# Patient Record
Sex: FEMALE | Race: BLACK OR AFRICAN AMERICAN | Employment: UNEMPLOYED | URBAN - METROPOLITAN AREA
[De-identification: names, ages, dates, MRNs, and addresses within clinical notes are randomized per-mention and may not be internally consistent; named-entity substitution may affect disease eponyms.]

---

## 2017-03-30 ENCOUNTER — OFFICE VISIT (OUTPATIENT)
Dept: ENDOCRINOLOGY | Age: 57
End: 2017-03-30

## 2017-03-30 VITALS
HEIGHT: 70 IN | BODY MASS INDEX: 35.2 KG/M2 | WEIGHT: 245.9 LBS | TEMPERATURE: 98.3 F | HEART RATE: 86 BPM | SYSTOLIC BLOOD PRESSURE: 130 MMHG | DIASTOLIC BLOOD PRESSURE: 70 MMHG | RESPIRATION RATE: 18 BRPM

## 2017-03-30 DIAGNOSIS — E11.65 TYPE 2 DIABETES MELLITUS WITH HYPERGLYCEMIA, WITH LONG-TERM CURRENT USE OF INSULIN (HCC): Primary | ICD-10-CM

## 2017-03-30 DIAGNOSIS — Z79.4 TYPE 2 DIABETES MELLITUS WITH HYPERGLYCEMIA, WITH LONG-TERM CURRENT USE OF INSULIN (HCC): Primary | ICD-10-CM

## 2017-03-30 DIAGNOSIS — E78.2 MIXED HYPERLIPIDEMIA: ICD-10-CM

## 2017-03-30 DIAGNOSIS — I10 ESSENTIAL HYPERTENSION: ICD-10-CM

## 2017-03-30 NOTE — MR AVS SNAPSHOT
Visit Information Date & Time Provider Department Dept. Phone Encounter #  
 3/30/2017  8:45 AM Mandy Perez MD [facility] [phone] [id number] Follow-up Instructions Return in about 4 months (around 7/30/2017). Upcoming Health Maintenance Date Due Hepatitis C Screening 1960 FOOT EXAM Q1 6/2/1968 EYE EXAM RETINAL OR DILATED Q1 6/2/1968 Pneumococcal 19-64 Medium Risk (1 of 1 - PPSV23) 6/2/1977 DTaP/Tdap/Td series (1 - Tdap) 6/2/1979 PAP AKA CERVICAL CYTOLOGY 6/2/1979 BREAST CANCER SCRN MAMMOGRAM 6/2/2008 FOBT Q 1 YEAR AGE 50-75 6/2/2008 INFLUENZA AGE 9 TO ADULT 8/1/2016 HEMOGLOBIN A1C Q6M 9/23/2017 MICROALBUMIN Q1 3/23/2018 LIPID PANEL Q1 3/23/2018 Allergies as of 3/30/2017  Review Complete On: 3/30/2017 By: Ascencion Vega LPN No Known Allergies Current Immunizations  Never Reviewed No immunizations on file. Not reviewed this visit You Were Diagnosed With   
  
 Codes Comments Type 2 diabetes mellitus with hyperglycemia, with long-term current use of insulin (HCC)    -  Primary ICD-10-CM: E11.65, Z79.4 ICD-9-CM: 250.00, 790.29, V58.67 Mixed hyperlipidemia     ICD-10-CM: E78.2 ICD-9-CM: 272.2 Essential hypertension     ICD-10-CM: I10 
ICD-9-CM: 401.9 Vitals BP Pulse Temp Resp Height(growth percentile) Weight(growth percentile) 130/70 (BP 1 Location: Right arm, BP Patient Position: Sitting) 86 98.3 °F (36.8 °C) (Oral) 18 5' 10\" (1.778 m) 245 lb 14.4 oz (111.5 kg) BMI OB Status Smoking Status 35.28 kg/m2 Hysterectomy Never Smoker BMI and BSA Data Body Mass Index Body Surface Area  
 35.28 kg/m 2 2.35 m 2 Preferred Pharmacy Pharmacy Name Phone [address], [facility] [phone] Your Updated Medication List  
  
   
 This list is accurate as of: 3/30/17  9:07 AM.  Always use your most recent med list.  
  
  
  
  
 CeleBREX 200 mg capsule Generic drug:  celecoxib Take  by mouth two (2) times a day. cholecalciferol 1,000 unit tablet Commonly known as:  VITAMIN D3 Take  by mouth daily. empagliflozin 25 mg tablet Commonly known as:  Mickiel Del Take 1 Tab by mouth every morning. ESTRADIOL (BULK) 1 mg by Does Not Apply route. glucose blood VI test strips strip Commonly known as:  PRECISION XTRA TEST Tests 3x daily Dx Code: E11.65  
  
 insulin CONCENTRATED regular 500 unit/mL Soln Commonly known as:  HumuLIN R Inject 12-14 unit marking before breakfast, 10 unit marking at lunch, 10 unit marking at dinner Insulin Needles (Disposable) 31 gauge x 5/16\" Ndle Use to inject Victoza daily Insulin Syringe-Needle U-100 0.5 mL 31 gauge x 5/16 Syrg Commonly known as:  BD INSULIN SYRINGE ULT-FINE II  
Use to inject U-500 insulin 3x daily Lancets Misc Test blood glucose 3 times daily Dx Code: E11.65  
  
 lisinopril 10 mg tablet Commonly known as:  Marilynne Shows Take 20 mg by mouth daily. metFORMIN  mg tablet Commonly known as:  GLUCOPHAGE XR  
2 pills Twice a day with meals  
  
 omeprazole 20 mg capsule Commonly known as:  PRILOSEC Take 20 mg by mouth daily. TANZEUM SC  
by SubCUTAneous route every seven (7) days. tolterodine ER 2 mg ER capsule Commonly known as:  DETROL-LA Take 2 mg by mouth daily. ZOCOR 40 mg tablet Generic drug:  simvastatin Take  by mouth nightly. Follow-up Instructions Return in about 4 months (around 7/30/2017). Introducing [facility]! Dear Florencia Weathers: Thank you for requesting a wufoo account. Our records indicate that you already have an active wufoo account. You can access your account anytime at [email or web address] Did you know that you can access your hospital and ER discharge instructions at any time in GoodChime!? You can also review all of your test results from your hospital stay or ER visit. Additional Information If you have questions, please visit the Frequently Asked Questions section of the GoodChime! website at [email or web address] Remember, GoodChime! is NOT to be used for urgent needs. For medical emergencies, dial 911. Now available from your iPhone and Android! Please provide this summary of care documentation to your next provider. Your primary care clinician is listed as Roxann Enciso. If you have any questions after today's visit, please call [phone].

## 2017-03-30 NOTE — PROGRESS NOTES
Chief Complaint   Patient presents with    Diabetes       History of Present Illness: Teo Blankenship is a 60 y.o. female here for fu of  Type 2 Diabetes Mellitus. She was doing very well on Victoza, switched to TAnzeum and now , fasting BG is higher   Needing more insulin    Hot flashes after stopping estrogen     SMBG 3 /day , fasting good  No hypoglycemia  She is on U -500  and she is doing fine    Monitoring frequency:3 /day       DM hx  Type 2 Diabetes was diagnosed in 2003 . End organ effects of diabetes: none. Diabetes medication regimen in the past  :oral agents (dual therapy): janumet,prandin and   insulin injections: : Novolog, 10u B, 18u L, 22u D, Levemir 40u BID. She is tired of multiple injections and wanted a simpler regimen. Cardiovascular risk factors: family history, diabetes mellitus, obesity, hypertension, post-menopausal         Wt Readings from Last 3 Encounters:   03/30/17 245 lb 14.4 oz (111.5 kg)   11/29/16 247 lb (112 kg)   07/25/16 248 lb (112.5 kg)       BP Readings from Last 3 Encounters:   03/30/17 130/70   11/29/16 115/61   07/25/16 134/65       Past Medical History:   Diagnosis Date    Diabetes mellitus     GERD (gastroesophageal reflux disease)     HTN (hypertension)     Hyperlipidemia      Current Outpatient Prescriptions   Medication Sig    ALBIGLUTIDE (TANZEUM SC) by SubCUTAneous route every seven (7) days.  empagliflozin (JARDIANCE) 25 mg tablet Take 1 Tab by mouth every morning.     glucose blood VI test strips (PRECISION XTRA TEST) strip Tests 3x daily Dx Code: E11.65    Lancets misc Test blood glucose 3 times daily Dx Code: E11.65    insulin CONCENTRATED regular (HUMULIN R) 500 unit/mL soln Inject 12-14 unit marking before breakfast, 10 unit marking at lunch, 10 unit marking at dinner    metFORMIN ER (GLUCOPHAGE XR) 500 mg tablet 2 pills Twice a day with meals    Insulin Syringe-Needle U-100 (BD INSULIN SYRINGE ULT-FINE II) 1/2 mL 31 x 5/16\" syrg Use to inject U-500 insulin 3x daily    Insulin Needles, Disposable, 31 gauge x 5/16\" ndle Use to inject Victoza daily    celecoxib (CELEBREX) 200 mg capsule Take  by mouth two (2) times a day.  tolterodine ER (DETROL-LA) 2 mg ER capsule Take 2 mg by mouth daily.  cholecalciferol (VITAMIN D3) 1,000 unit tablet Take  by mouth daily.  omeprazole (PRILOSEC) 20 mg capsule Take 20 mg by mouth daily.  lisinopril (PRINIVIL, ZESTRIL) 10 mg tablet Take 20 mg by mouth daily.  simvastatin (ZOCOR) 40 mg tablet Take  by mouth nightly.  ESTRADIOL, BULK, 1 mg by Does Not Apply route. No current facility-administered medications for this visit. No Known Allergies      Review of Systems:  - Eyes: no blurry vision no double vision  - Cardiovascular: no chest pain ,no palpitations  - Respiratory: no cough no shortness of breath  - Gastrointestinal: no dysphagia no  abdominal pain  -   -     Physical Examination:   Blood pressure 130/70, pulse 86, temperature 98.3 °F (36.8 °C), temperature source Oral, resp. rate 18, height 5' 10\" (1.778 m), weight 245 lb 14.4 oz (111.5 kg). Estimated body mass index is 35.28 kg/(m^2) as calculated from the following:    Height as of this encounter: 5' 10\" (1.778 m). -   Weight as of this encounter: 245 lb 14.4 oz (111.5 kg). - General: pleasant, no distress, good eye contact  - HEENT: no pallor, no periorbital edema, EOMI  - Neck: supple, no thyromegaly  - Cardiovascular: regular, normal rate, normal S1 and S2  - Respiratory: clear to auscultation bilaterally  - Gastrointestinal: soft, nontender, nondistended,  BS +  - Musculoskeletal[de-identified] no edema, no foot ulcers  - Neurological:alert and oriented  - Psychiatric: normal mood and affect  - Skin: color, texture, turgor normal.       Data Reviewed:     [x] Glucose records reviewed. [] See glucose records for details (to be scanned).   [] A1C  [] Reviewed labs    Lab Results   Component Value Date/Time    Hemoglobin A1c 6.8 03/23/2017 08:49 AM    Hemoglobin A1c 6.6 04/21/2016 08:23 AM    Hemoglobin A1c 6.5 01/07/2016 08:20 AM    Glucose 143 03/23/2017 08:49 AM    Glucose  02/13/2015 09:20 AM    Microalb/Creat ratio (ug/mg creat.) <3.3 03/23/2017 08:49 AM    LDL, calculated 68 03/23/2017 08:49 AM    Creatinine 0.79 03/23/2017 08:49 AM    Hemoglobin A1c, External 6.7 11/04/2016      Lab Results   Component Value Date/Time    Cholesterol, total 140 03/23/2017 08:49 AM    HDL Cholesterol 39 03/23/2017 08:49 AM    LDL, calculated 68 03/23/2017 08:49 AM    Triglyceride 164 03/23/2017 08:49 AM     Lab Results   Component Value Date/Time    ALT (SGPT) 14 03/23/2017 08:49 AM    AST (SGOT) 15 03/23/2017 08:49 AM    Alk. phosphatase 95 03/23/2017 08:49 AM    Bilirubin, total 0.7 03/23/2017 08:49 AM     Lab Results   Component Value Date/Time    GFR est AA 95 03/23/2017 08:49 AM    GFR est non-AA 83 03/23/2017 08:49 AM    Creatinine 0.79 03/23/2017 08:49 AM    BUN 16 03/23/2017 08:49 AM    Sodium 143 03/23/2017 08:49 AM    Potassium 4.7 03/23/2017 08:49 AM    Chloride 101 03/23/2017 08:49 AM    CO2 25 03/23/2017 08:49 AM      No results found for: TSH, TSHEXT       Assessment/Plan:     1. Type 2 Diabetes Mellitus with no known complications   Lab Results   Component Value Date/Time    Hemoglobin A1c 6.8 03/23/2017 08:49 AM    Hemoglobin A1c (POC) 6.5 02/13/2015 09:20 AM    Hemoglobin A1c, External 6.7 11/04/2016   A1C 6.7 recently at [facility]   On concentrated insulin ( U -500 ) 1 0 - 12 - 10 units before each meal  Metformin  mg BID  Tanzeum - not controlling well - she needs either Victoza or Trulicity based on scientific data    Bydureon - had velts, failed Bydureon in the past .  Jardiacne     FLU annually ,Pneumovax ,aspirin daily,annual eye exam,microalbumin. 2.  HTN : Continue current therapy     3. Hyperlipidemia : Continue statin. 4.Obesity Body mass index is 35.28 kg/(m^2).       Thank you for allowing me to participate in the care of this patient.     Ulises Allred MD

## 2017-03-30 NOTE — PROGRESS NOTES
El Koehler is a 60 y.o. female here for   Chief Complaint   Patient presents with    Diabetes       Functional glucose monitor and record keeping system? - yes  Eye exam within last year? - yes  Foot exam within last year? - yes, last visit    Lab Results   Component Value Date/Time    Hemoglobin A1c 6.8 03/23/2017 08:49 AM    Hemoglobin A1c (POC) 6.5 02/13/2015 09:20 AM    Hemoglobin A1c, External 6.7 11/04/2016       Wt Readings from Last 3 Encounters:   11/29/16 247 lb (112 kg)   07/25/16 248 lb (112.5 kg)   04/28/16 248 lb 3.2 oz (112.6 kg)     Temp Readings from Last 3 Encounters:   11/29/16 97.8 °F (36.6 °C) (Oral)   07/25/16 98.5 °F (36.9 °C) (Oral)   04/28/16 98.7 °F (37.1 °C) (Oral)     BP Readings from Last 3 Encounters:   11/29/16 115/61   07/25/16 134/65   04/28/16 127/61     Pulse Readings from Last 3 Encounters:   11/29/16 80   07/25/16 94   04/28/16 77

## 2017-05-09 DIAGNOSIS — Z79.4 TYPE 2 DIABETES MELLITUS WITH HYPERGLYCEMIA, WITH LONG-TERM CURRENT USE OF INSULIN (HCC): Primary | ICD-10-CM

## 2017-05-09 DIAGNOSIS — E11.65 TYPE 2 DIABETES MELLITUS WITH HYPERGLYCEMIA, WITH LONG-TERM CURRENT USE OF INSULIN (HCC): Primary | ICD-10-CM

## 2017-05-09 RX ORDER — LANCETS 28 GAUGE
EACH MISCELLANEOUS
Qty: 300 LANCET | Refills: 3 | Status: SHIPPED | OUTPATIENT
Start: 2017-05-09 | End: 2019-01-20 | Stop reason: SDUPTHER

## 2017-05-09 RX ORDER — INSULIN PUMP SYRINGE, 3 ML
EACH MISCELLANEOUS
Qty: 1 KIT | Refills: 0 | Status: SHIPPED | OUTPATIENT
Start: 2017-05-09

## 2017-05-27 DIAGNOSIS — Z79.4 TYPE 2 DIABETES MELLITUS WITH HYPERGLYCEMIA, WITH LONG-TERM CURRENT USE OF INSULIN (HCC): ICD-10-CM

## 2017-05-27 DIAGNOSIS — E11.65 TYPE 2 DIABETES MELLITUS WITH HYPERGLYCEMIA, WITH LONG-TERM CURRENT USE OF INSULIN (HCC): ICD-10-CM

## 2017-05-30 DIAGNOSIS — E11.65 TYPE 2 DIABETES MELLITUS WITH HYPERGLYCEMIA, WITH LONG-TERM CURRENT USE OF INSULIN (HCC): Primary | ICD-10-CM

## 2017-05-30 DIAGNOSIS — Z79.4 TYPE 2 DIABETES MELLITUS WITH HYPERGLYCEMIA, WITH LONG-TERM CURRENT USE OF INSULIN (HCC): Primary | ICD-10-CM

## 2017-05-30 RX ORDER — METFORMIN HYDROCHLORIDE 500 MG/1
TABLET, EXTENDED RELEASE ORAL
Qty: 360 TAB | Refills: 4 | Status: SHIPPED | OUTPATIENT
Start: 2017-05-30 | End: 2018-05-17 | Stop reason: ALTCHOICE

## 2017-05-30 NOTE — TELEPHONE ENCOUNTER
From: Radha York  To:  Omaira White MD  Sent: 5/27/2017 6:21 PM EDT  Subject: Medication Renewal Request    Original authorizing provider: MD Radha Granger would like a refill of the following medications:  dulaglutide (TRULICITY) 1.5 XB/1.9 mL sub-q pen Omaira White MD]    Preferred pharmacy: [facility]:

## 2017-05-30 NOTE — TELEPHONE ENCOUNTER
From: Nery Mejia  To:  Bree Hood MD  Sent: 5/30/2017 9:20 AM EDT  Subject: Medication Renewal Request    Original authorizing provider: MD Nery Sandy would like a refill of the following medications:  metFORMIN ER (GLUCOPHAGE XR) 500 mg tablet [Uma Maretta Meckel, MD]    Preferred pharmacy: [facility] [address] [place] [address] [place] [address]    Comment:

## 2017-06-01 ENCOUNTER — TELEPHONE (OUTPATIENT)
Dept: ENDOCRINOLOGY | Age: 57
End: 2017-06-01

## 2017-06-01 NOTE — TELEPHONE ENCOUNTER
Patients insurance requires a prior auth for Trulicity. Please contact her to let her know where she stands with this .  Thank you

## 2017-08-02 ENCOUNTER — OFFICE VISIT (OUTPATIENT)
Dept: ENDOCRINOLOGY | Age: 57
End: 2017-08-02

## 2017-08-02 VITALS
HEIGHT: 70 IN | RESPIRATION RATE: 17 BRPM | DIASTOLIC BLOOD PRESSURE: 59 MMHG | BODY MASS INDEX: 34.52 KG/M2 | OXYGEN SATURATION: 98 % | WEIGHT: 241.1 LBS | HEART RATE: 81 BPM | SYSTOLIC BLOOD PRESSURE: 139 MMHG | TEMPERATURE: 98 F

## 2017-08-02 DIAGNOSIS — I10 ESSENTIAL HYPERTENSION: ICD-10-CM

## 2017-08-02 DIAGNOSIS — E78.2 MIXED HYPERLIPIDEMIA: ICD-10-CM

## 2017-08-02 DIAGNOSIS — E11.65 TYPE 2 DIABETES MELLITUS WITH HYPERGLYCEMIA, UNSPECIFIED LONG TERM INSULIN USE STATUS: Primary | ICD-10-CM

## 2017-08-02 LAB
GLUCOSE POC: 203 MG/DL
HBA1C MFR BLD HPLC: 7.3 %

## 2017-08-02 NOTE — PROGRESS NOTES
Chief Complaint   Patient presents with    Diabetes     1. Have you been to the ER, urgent care clinic since your last visit? Hospitalized since your last visit? No    2. Have you seen or consulted any other health care providers outside of the [address] [place] [address] since your last visit? Include any pap smears or colon screening.  No     Had a foot exam within the last year  Has a eye exam within the last year      Pt has her meter

## 2017-08-02 NOTE — PROGRESS NOTES
Chief Complaint   Patient presents with    Diabetes       History of Present Illness: Norah Canela is a 59 y.o. female here for fu of  Type 2 Diabetes Mellitus. She is on trulicity ,  Forgetting insulin   no hot flashes     SMBG 3 /day ,   No hypoglycemia  She is on U -500      Monitoring frequency:3 /day       DM hx  Type 2 Diabetes was diagnosed in 2003 . End organ effects of diabetes: none. Diabetes medication regimen in the past  :oral agents (dual therapy): janumet,prandin and   insulin injections: : Novolog, 10u B, 18u L, 22u D, Levemir 40u BID. She is tired of multiple injections and wanted a simpler regimen. Cardiovascular risk factors: family history, diabetes mellitus, obesity, hypertension, post-menopausal         Wt Readings from Last 3 Encounters:   08/02/17 241 lb 1.6 oz (109.4 kg)   03/30/17 245 lb 14.4 oz (111.5 kg)   11/29/16 247 lb (112 kg)       BP Readings from Last 3 Encounters:   08/02/17 139/59   03/30/17 130/70   11/29/16 115/61       Past Medical History:   Diagnosis Date    Diabetes mellitus     GERD (gastroesophageal reflux disease)     HTN (hypertension)     Hyperlipidemia      Current Outpatient Prescriptions   Medication Sig    dulaglutide (TRULICITY) 1.5 GX/1.6 mL sub-q pen 0.5 mL by SubCUTAneous route every seven (7) days.  metFORMIN ER (GLUCOPHAGE XR) 500 mg tablet 2 pills Twice a day with meals    insulin CONCENTRATED regular (HUMULIN R) 500 unit/mL soln Inject 12-14 unit marking before breakfast, 10 unit marking at lunch, 10 unit marking at dinner with U-100 syringe    Blood-Glucose Meter (FREESTYLE LITE METER) monitoring kit Test 3 times daily Dx Code: E11.65    glucose blood VI test strips (FREESTYLE LITE STRIPS) strip Tests 3x daily Dx Code: E11.65    lancets (FREESTYLE LANCETS) 28 gauge misc Tests 3x daily Dx Code: E11.65    empagliflozin (JARDIANCE) 25 mg tablet Take 1 Tab by mouth every morning.     Insulin Syringe-Needle U-100 (BD INSULIN SYRINGE ULT-FINE II) 1/2 mL 31 x 5/16\" syrg Use to inject U-500 insulin 3x daily    Insulin Needles, Disposable, 31 gauge x 5/16\" ndle Use to inject Victoza daily    tolterodine ER (DETROL-LA) 2 mg ER capsule Take 2 mg by mouth daily.  cholecalciferol (VITAMIN D3) 1,000 unit tablet Take  by mouth daily.  lisinopril (PRINIVIL, ZESTRIL) 10 mg tablet Take 20 mg by mouth daily.  ESTRADIOL, BULK, 1 mg by Does Not Apply route.  simvastatin (ZOCOR) 40 mg tablet Take  by mouth nightly.  celecoxib (CELEBREX) 200 mg capsule Take  by mouth two (2) times a day.  omeprazole (PRILOSEC) 20 mg capsule Take 20 mg by mouth daily. No current facility-administered medications for this visit. No Known Allergies      Review of Systems:  - Eyes: no blurry vision no double vision  - Cardiovascular: no chest pain ,no palpitations  - Respiratory: no cough no shortness of breath  - Gastrointestinal: no dysphagia no  abdominal pain  -   -     Physical Examination:   Blood pressure 139/59, pulse 81, temperature 98 °F (36.7 °C), temperature source Oral, resp. rate 17, height 5' 10\" (1.778 m), weight 241 lb 1.6 oz (109.4 kg), SpO2 98 %. Estimated body mass index is 34.59 kg/(m^2) as calculated from the following:    Height as of this encounter: 5' 10\" (1.778 m). -   Weight as of this encounter: 241 lb 1.6 oz (109.4 kg). - General: pleasant, no distress, good eye contact  - HEENT: no pallor, no periorbital edema, EOMI  - Neck: supple, no thyromegaly  - Cardiovascular: regular, normal rate, normal S1 and S2  - Respiratory: clear to auscultation bilaterally  - Gastrointestinal: soft, nontender, nondistended,  BS +  - Musculoskeletal[de-identified] no edema, no foot ulcers  - Neurological:alert and oriented  - Psychiatric: normal mood and affect  - Skin: color, texture, turgor normal.       Data Reviewed:     [x] Glucose records reviewed. [] See glucose records for details (to be scanned).   [] A1C  [] Reviewed labs    Lab Results Component Value Date/Time    Hemoglobin A1c 6.8 03/23/2017 08:49 AM    Hemoglobin A1c 6.6 04/21/2016 08:23 AM    Hemoglobin A1c 6.5 01/07/2016 08:20 AM    Glucose 143 03/23/2017 08:49 AM    Glucose  08/02/2017 08:38 AM    Microalb/Creat ratio (ug/mg creat.) <3.3 03/23/2017 08:49 AM    LDL, calculated 68 03/23/2017 08:49 AM    Creatinine 0.79 03/23/2017 08:49 AM    Hemoglobin A1c, External 6.7 11/04/2016      Lab Results   Component Value Date/Time    Cholesterol, total 140 03/23/2017 08:49 AM    HDL Cholesterol 39 03/23/2017 08:49 AM    LDL, calculated 68 03/23/2017 08:49 AM    Triglyceride 164 03/23/2017 08:49 AM     Lab Results   Component Value Date/Time    ALT (SGPT) 14 03/23/2017 08:49 AM    AST (SGOT) 15 03/23/2017 08:49 AM    Alk. phosphatase 95 03/23/2017 08:49 AM    Bilirubin, total 0.7 03/23/2017 08:49 AM     Lab Results   Component Value Date/Time    GFR est AA 95 03/23/2017 08:49 AM    GFR est non-AA 83 03/23/2017 08:49 AM    Creatinine 0.79 03/23/2017 08:49 AM    BUN 16 03/23/2017 08:49 AM    Sodium 143 03/23/2017 08:49 AM    Potassium 4.7 03/23/2017 08:49 AM    Chloride 101 03/23/2017 08:49 AM    CO2 25 03/23/2017 08:49 AM      No results found for: TSH, TSHEXT       Assessment/Plan:     1. Type 2 Diabetes Mellitus with no known complications   Lab Results   Component Value Date/Time    Hemoglobin A1c 6.8 03/23/2017 08:49 AM    Hemoglobin A1c (POC) 7.3 08/02/2017 08:38 AM    Hemoglobin A1c, External 6.7 11/04/2016     On concentrated insulin ( U -500 ) 12 - 10 - 10 units before each meal  Metformin  mg BID   Trulicity     Bydureon - had velts, failed Bydureon in the past .Tried Tanzeum   Jardiacne     FLU annually ,Pneumovax ,aspirin daily,annual eye exam,microalbumin. 2.  HTN : Continue current therapy     3. Hyperlipidemia : Continue statin. 4.Obesity Body mass index is 34.59 kg/(m^2). Thank you for allowing me to participate in the care of this patient.     Ingrid Riojas, MD

## 2017-08-02 NOTE — MR AVS SNAPSHOT
Visit Information Date & Time Provider Department Dept. Phone Encounter #  
 8/2/2017  8:30 AM Radha Capellan MD [facility] [phone] [id number] Follow-up Instructions Return in about 4 months (around 12/2/2017). Upcoming Health Maintenance Date Due Hepatitis C Screening 1960 FOOT EXAM Q1 6/2/1968 EYE EXAM RETINAL OR DILATED Q1 6/2/1968 Pneumococcal 19-64 Medium Risk (1 of 1 - PPSV23) 6/2/1977 DTaP/Tdap/Td series (1 - Tdap) 6/2/1979 PAP AKA CERVICAL CYTOLOGY 6/2/1979 BREAST CANCER SCRN MAMMOGRAM 6/2/2008 FOBT Q 1 YEAR AGE 50-75 6/2/2008 INFLUENZA AGE 9 TO ADULT 8/1/2017 HEMOGLOBIN A1C Q6M 9/23/2017 MICROALBUMIN Q1 3/23/2018 LIPID PANEL Q1 3/23/2018 Allergies as of 8/2/2017  Review Complete On: 8/2/2017 By: Radha Capellan MD  
 No Known Allergies Current Immunizations  Never Reviewed No immunizations on file. Not reviewed this visit You Were Diagnosed With   
  
 Codes Comments Type 2 diabetes mellitus with hyperglycemia, unspecified long term insulin use status (HCC)    -  Primary ICD-10-CM: E11.65 ICD-9-CM: 250.00 Essential hypertension     ICD-10-CM: I10 
ICD-9-CM: 401.9 Mixed hyperlipidemia     ICD-10-CM: E78.2 ICD-9-CM: 272.2 Vitals BP Pulse Temp Resp Height(growth percentile) Weight(growth percentile) 139/59 (BP 1 Location: Right arm, BP Patient Position: Sitting) 81 98 °F (36.7 °C) (Oral) 17 5' 10\" (1.778 m) 241 lb 1.6 oz (109.4 kg) SpO2 BMI OB Status Smoking Status 98% 34.59 kg/m2 Hysterectomy Never Smoker BMI and BSA Data Body Mass Index Body Surface Area 34.59 kg/m 2 2.32 m 2 Preferred Pharmacy Pharmacy Name Phone [facility] [address] [address] [phone] Your Updated Medication List  
  
   
This list is accurate as of: 8/2/17  8:55 AM.  Always use your most recent med list.  
  
  
  
  
 Blood-Glucose Meter monitoring kit Commonly known as:  FREESTYLE LITE METER Test 3 times daily Dx Code: E11.65 CeleBREX 200 mg capsule Generic drug:  celecoxib Take  by mouth two (2) times a day. cholecalciferol 1,000 unit tablet Commonly known as:  VITAMIN D3 Take  by mouth daily. dulaglutide 1.5 mg/0.5 mL sub-q pen Commonly known as:  TRULICITY  
0.5 mL by SubCUTAneous route every seven (7) days. empagliflozin 25 mg tablet Commonly known as:  Dorcas Janet Take 1 Tab by mouth every morning. ESTRADIOL (BULK) 1 mg by Does Not Apply route. glucose blood VI test strips strip Commonly known as:  FREESTYLE LITE STRIPS Tests 3x daily Dx Code: E11.65  
  
 insulin CONCENTRATED regular 500 unit/mL Soln Commonly known as:  HumuLIN R Inject 12-14 unit marking before breakfast, 10 unit marking at lunch, 10 unit marking at dinner with U-100 syringe Insulin Needles (Disposable) 31 gauge x 5/16\" Ndle Use to inject Victoza daily Insulin Syringe-Needle U-100 0.5 mL 31 gauge x 5/16 Syrg Commonly known as:  BD INSULIN SYRINGE ULT-FINE II  
Use to inject U-500 insulin 3x daily  
  
 lancets 28 gauge Misc Commonly known as:  FREESTYLE LANCETS Tests 3x daily Dx Code: E11.65  
  
 lisinopril 10 mg tablet Commonly known as:  Maria Boyd Take 20 mg by mouth daily. metFORMIN  mg tablet Commonly known as:  GLUCOPHAGE XR  
2 pills Twice a day with meals  
  
 omeprazole 20 mg capsule Commonly known as:  PRILOSEC Take 20 mg by mouth daily. tolterodine ER 2 mg ER capsule Commonly known as:  DETROL-LA Take 2 mg by mouth daily. ZOCOR 40 mg tablet Generic drug:  simvastatin Take  by mouth nightly. We Performed the Following AMB POC GLUCOSE BLOOD, BY GLUCOSE MONITORING DEVICE [04444 CPT(R)] AMB POC HEMOGLOBIN A1C [15085 CPT(R)] Follow-up Instructions Return in about 4 months (around 12/2/2017). Introducing [facility] & [facility]! Dear Ray Sherwood: Thank you for requesting a [employer] account. Our records indicate that you already have an active Inn[employer] account. You can access your account anytime at [email or web address] Did you know that you can access your hospital and ER discharge instructions at any time in Inn[employer]ight? You can also review all of your test results from your hospital stay or ER visit. Additional Information If you have questions, please visit the Frequently Asked Questions section of the Innolight website at [email or web address] Remember, Inn[employer] is NOT to be used for urgent needs. For medical emergencies, dial 911. Now available from your iPhone and Android! Please provide this summary of care documentation to your next provider. Your primary care clinician is listed as Grace Enciso. If you have any questions after today's visit, please call [phone].

## 2017-09-15 RX ORDER — NAPROXEN SODIUM 220 MG
TABLET ORAL
Qty: 300 SYRINGE | Refills: 11 | Status: SHIPPED | OUTPATIENT
Start: 2017-09-15 | End: 2019-02-08

## 2017-09-15 NOTE — TELEPHONE ENCOUNTER
From: Radha Pearson  To:  Jeri Neumann MD  Sent: 9/14/2017 7:39 PM EDT  Subject: Medication Renewal Request    Original authorizing provider: MD Radha Vyas would like a refill of the following medications:  Insulin Syringe-Needle U-100 (BD INSULIN SYRINGE ULT-FINE II) 1/2 mL 31 x 5/16\" katie Neumann MD]    Preferred pharmacy: [facility] [place] [address] [place] [address] [facility]    Comment:

## 2018-05-17 ENCOUNTER — OFFICE VISIT (OUTPATIENT)
Dept: ENDOCRINOLOGY | Age: 58
End: 2018-05-17

## 2018-05-17 VITALS
SYSTOLIC BLOOD PRESSURE: 143 MMHG | HEART RATE: 71 BPM | WEIGHT: 230.2 LBS | OXYGEN SATURATION: 100 % | BODY MASS INDEX: 32.96 KG/M2 | DIASTOLIC BLOOD PRESSURE: 68 MMHG | TEMPERATURE: 98.6 F | HEIGHT: 70 IN | RESPIRATION RATE: 14 BRPM

## 2018-05-17 DIAGNOSIS — I10 ESSENTIAL HYPERTENSION: ICD-10-CM

## 2018-05-17 DIAGNOSIS — E78.2 MIXED HYPERLIPIDEMIA: ICD-10-CM

## 2018-05-17 DIAGNOSIS — E11.65 TYPE 2 DIABETES MELLITUS WITH HYPERGLYCEMIA, WITH LONG-TERM CURRENT USE OF INSULIN (HCC): ICD-10-CM

## 2018-05-17 DIAGNOSIS — E11.65 TYPE 2 DIABETES MELLITUS WITH HYPERGLYCEMIA, WITH LONG-TERM CURRENT USE OF INSULIN (HCC): Primary | ICD-10-CM

## 2018-05-17 DIAGNOSIS — Z79.4 TYPE 2 DIABETES MELLITUS WITH HYPERGLYCEMIA, WITH LONG-TERM CURRENT USE OF INSULIN (HCC): Primary | ICD-10-CM

## 2018-05-17 DIAGNOSIS — Z79.4 TYPE 2 DIABETES MELLITUS WITH HYPERGLYCEMIA, WITH LONG-TERM CURRENT USE OF INSULIN (HCC): ICD-10-CM

## 2018-05-17 LAB
GLUCOSE POC: 218 MG/DL
HBA1C MFR BLD HPLC: 7.7 %

## 2018-05-17 RX ORDER — GLIPIZIDE 5 MG/1
5-10 TABLET, FILM COATED, EXTENDED RELEASE ORAL DAILY
COMMUNITY
End: 2018-08-06 | Stop reason: SDUPTHER

## 2018-05-17 RX ORDER — POLYETHYLENE GLYCOL 3350 17 G/17G
17 POWDER, FOR SOLUTION ORAL AS NEEDED
COMMUNITY
End: 2020-10-19

## 2018-05-17 NOTE — PROGRESS NOTES
Mary Alice Farris is a 59 y.o. female here for   Chief Complaint   Patient presents with    Diabetes    Cholesterol Problem    Hypertension       Functional glucose monitor and record keeping system? - yes  Eye exam within last year? Vera Cee 6 mo ago  Foot exam within last year? - due    1. Have you been to the ER, urgent care clinic since your last visit? Hospitalized since your last visit? -no    2. Have you seen or consulted any other health care providers outside of the [facility] since your last visit? Include any pap smears or colon screening. -PCP      Lab Results   Component Value Date/Time    Hemoglobin A1c 6.8 (H) 03/23/2017 08:49 AM    Hemoglobin A1c (POC) 7.3 08/02/2017 08:38 AM    Hemoglobin A1c, External 6.7 11/04/2016       Wt Readings from Last 3 Encounters:   08/02/17 241 lb 1.6 oz (109.4 kg)   03/30/17 245 lb 14.4 oz (111.5 kg)   11/29/16 247 lb (112 kg)     Temp Readings from Last 3 Encounters:   08/02/17 98 °F (36.7 °C) (Oral)   03/30/17 98.3 °F (36.8 °C) (Oral)   11/29/16 97.8 °F (36.6 °C) (Oral)     BP Readings from Last 3 Encounters:   08/02/17 139/59   03/30/17 130/70   11/29/16 115/61     Pulse Readings from Last 3 Encounters:   08/02/17 81   03/30/17 86   11/29/16 80

## 2018-05-17 NOTE — MR AVS SNAPSHOT
[facility] [address] 
[phone] Patient: Aleida Daniel MRN: [id number] KYL:[birth year] Visit Information Date & Time Provider Department Dept. Phone Encounter #  
 5/17/2018  1:45 PM Emily Morton MD [facility] [phone] [id number] Follow-up Instructions Return in about 4 months (around 9/17/2018). Upcoming Health Maintenance Date Due Hepatitis C Screening 1960 FOOT EXAM Q1 6/2/1968 EYE EXAM RETINAL OR DILATED Q1 6/2/1968 Pneumococcal 19-64 Medium Risk (1 of 1 - PPSV23) 6/2/1977 DTaP/Tdap/Td series (1 - Tdap) 6/2/1979 PAP AKA CERVICAL CYTOLOGY 6/2/1979 BREAST CANCER SCRN MAMMOGRAM 6/2/2008 FOBT Q 1 YEAR AGE 50-75 6/2/2008 HEMOGLOBIN A1C Q6M 2/2/2018 MICROALBUMIN Q1 3/23/2018 LIPID PANEL Q1 3/23/2018 ZOSTER VACCINE AGE 60> 4/2/2018 Influenza Age 5 to Adult 8/1/2018 Allergies as of 5/17/2018  Review Complete On: 5/17/2018 By: Emily Morton MD  
 No Known Allergies Current Immunizations  Never Reviewed No immunizations on file. Not reviewed this visit You Were Diagnosed With   
  
 Codes Comments Type 2 diabetes mellitus with hyperglycemia, with long-term current use of insulin (HCC)    -  Primary ICD-10-CM: E11.65, Z79.4 ICD-9-CM: 250.00, 790.29, V58.67 Mixed hyperlipidemia     ICD-10-CM: E78.2 ICD-9-CM: 272.2 Essential hypertension     ICD-10-CM: I10 
ICD-9-CM: 401.9 Vitals BP Pulse Temp Resp Height(growth percentile) Weight(growth percentile) 143/68 (BP 1 Location: Left arm, BP Patient Position: Sitting) 71 98.6 °F (37 °C) (Oral) 14 5' 10\" (1.778 m) 230 lb 3.2 oz (104.4 kg) SpO2 BMI OB Status Smoking Status 100% 33.03 kg/m2 Hysterectomy Never Smoker Vitals History BMI and BSA Data Body Mass Index Body Surface Area  33.03 kg/m 2 2.27 m 2  
  
  
 Preferred Pharmacy Pharmacy Name Phone [facility] [address] [address] [phone] Your Updated Medication List  
  
   
This list is accurate as of 5/17/18  2:36 PM.  Always use your most recent med list.  
  
  
  
  
 Blood-Glucose Meter monitoring kit Commonly known as:  FREESTYLE LITE METER Test 3 times daily Dx Code: E11.65 CeleBREX 200 mg capsule Generic drug:  celecoxib Take  by mouth two (2) times a day. cholecalciferol 1,000 unit tablet Commonly known as:  VITAMIN D3 Take 5,000 Units by mouth daily. dulaglutide 1.5 mg/0.5 mL sub-q pen Commonly known as:  TRULICITY  
0.5 mL by SubCUTAneous route every seven (7) days. empagliflozin 25 mg tablet Commonly known as:  Pama Link Take 1 Tab by mouth every morning. ESTRADIOL (BULK) 1 mg by Does Not Apply route. glipiZIDE SR 5 mg CR tablet Commonly known as:  GLUCOTROL XL Take 5-10 mg by mouth daily. glucose blood VI test strips strip Commonly known as:  FREESTYLE LITE STRIPS Tests 3x daily Dx Code: E11.65  
  
 insulin CONCENTRATED regular 500 unit/mL Soln Commonly known as:  HumuLIN R U-500 Inject 12-14 unit marking before breakfast, 10 unit marking at lunch, 10 unit marking at dinner with U-100 syringe Insulin Needles (Disposable) 31 gauge x 5/16\" Ndle Use to inject Victoza daily Insulin Syringe-Needle U-100 0.5 mL 31 gauge x 5/16 Syrg Commonly known as:  BD INSULIN SYRINGE ULT-FINE II  
Use to inject U-500 insulin 3x daily JANUMET -1,000 mg Tm24 Generic drug:  SITagliptin-metFORMIN Take 1 Tab by mouth daily. lancets 28 gauge Misc Commonly known as:  FREESTYLE LANCETS Tests 3x daily Dx Code: E11.65  
  
 lisinopril 10 mg tablet Commonly known as:  Pecola Cypher Take 20 mg by mouth daily. MIRALAX 17 gram packet Generic drug:  polyethylene glycol Take 17 g by mouth daily. omeprazole 20 mg capsule Commonly known as:  PRILOSEC Take 20 mg by mouth daily. tolterodine ER 2 mg ER capsule Commonly known as:  DETROL-LA Take 2 mg by mouth daily. ZOCOR 40 mg tablet Generic drug:  simvastatin Take 40 mg by mouth nightly. We Performed the Following AMB POC GLUCOSE, QUANTITATIVE, BLOOD [00229 CPT(R)] AMB POC HEMOGLOBIN A1C [64695 CPT(R)] Follow-up Instructions Return in about 4 months (around 9/17/2018). Patient Instructions Please remember to bring your meter and/or log to every visit. Also, be sure to have a dilated diabetic eye exam done annually. Refills -Please call your pharmacy and have them send us a refill request. 
Results - Allow up to a week for lab results to be processed and reviewed. Phone calls - Allow up to 24 hours for non-urgent calls to be returned. Prior Authorization - May take up to 2 weeks to process depending on your insurance. Forms - FMLA, DMV, Patient Assistance, etc. will take up to 2 weeks to process. Cancellations - Please notify the office in advance if you cannot keep your appointment. Samples - Will only be dispensed at visits as supplies are limited. If you are having a medical emergency, call 911. Introducing [facility]! Dear Leticia Cisneros: Thank you for requesting a [employer] account. Our records indicate that you already have an active [employer] account. You can access your account anytime at [email or web address] Did you know that you can access your hospital and ER discharge instructions at any time in Synaptic [employer]? You can also review all of your test results from your hospital stay or ER visit. Additional Information If you have questions, please visit the Frequently Asked Questions section of the [employer] website at [email or web address] Remember, Synaptic [employer] is NOT to be used for urgent needs. For medical emergencies, dial 911. Now available from your iPhone and Android! Please provide this summary of care documentation to your next provider. Your primary care clinician is listed as Casandra Enciso. If you have any questions after today's visit, please call [phone].

## 2018-05-17 NOTE — LETTER
5/19/2018 11:49 AM 
 
Patient:  Alisha Coello YOB: 1960 Date of Visit: 5/17/2018 Dear CHINMAY Pretty 
[address] [address] VIA Facsimile: [phone] 
 : Thank you for referring Ms. Leobardo Vega to me for evaluation/treatment. Below are the relevant portions of my assessment and plan of care. If you have questions, please do not hesitate to call me. I look forward to following Ms. Roxy Obregon along with you. Sincerely, Jose Alejandro Becerra MD

## 2018-05-17 NOTE — PROGRESS NOTES
Chief Complaint   Patient presents with    Diabetes    Cholesterol Problem    Hypertension       History of Present Illness: Shaheen De León is a 59 y.o. female here for fu of  Type 2 Diabetes Mellitus. Recently went on a strict low-carb diet, [place]. [place] is off insulin and Trulicity  On Janumet and glipizide  She is almost following the ketogenic diet. She is increasing the fat and  the protein, limiting the carbohydrates. Last 2 weeks she got frustrated about the diet and started eating more. The blood glucose are increasing. SMBG 3 /day ,           DM hx  Type 2 Diabetes was diagnosed in 2003 . Cardiovascular risk factors: family history, diabetes mellitus, obesity, hypertension, post-menopausal         Wt Readings from Last 3 Encounters:   05/17/18 230 lb 3.2 oz (104.4 kg)   08/02/17 241 lb 1.6 oz (109.4 kg)   03/30/17 245 lb 14.4 oz (111.5 kg)       BP Readings from Last 3 Encounters:   05/17/18 143/68   08/02/17 139/59   03/30/17 130/70       Past Medical History:   Diagnosis Date    Diabetes mellitus     GERD (gastroesophageal reflux disease)     HTN (hypertension)     Hyperlipidemia      Current Outpatient Prescriptions   Medication Sig    polyethylene glycol (MIRALAX) 17 gram packet Take 17 g by mouth daily.  glipiZIDE SR (GLUCOTROL XL) 5 mg CR tablet Take 5-10 mg by mouth daily.  SITagliptin-metFORMIN (JANUMET XR) 100-1,000 mg TM24 Take 1 Tab by mouth daily.     Insulin Syringe-Needle U-100 (BD INSULIN SYRINGE ULT-FINE II) 0.5 mL 31 gauge x 5/16 syrg Use to inject U-500 insulin 3x daily    Blood-Glucose Meter (FREESTYLE LITE METER) monitoring kit Test 3 times daily Dx Code: E11.65    glucose blood VI test strips (FREESTYLE LITE STRIPS) strip Tests 3x daily Dx Code: E11.65    lancets (FREESTYLE LANCETS) 28 gauge misc Tests 3x daily Dx Code: E11.65    Insulin Needles, Disposable, 31 gauge x 5/16\" ndle Use to inject Victoza daily    tolterodine ER (DETROL-LA) 2 mg ER capsule Take 2 mg by mouth daily.  cholecalciferol (VITAMIN D3) 1,000 unit tablet Take 5,000 Units by mouth daily.  omeprazole (PRILOSEC) 20 mg capsule Take 20 mg by mouth daily.  lisinopril (PRINIVIL, ZESTRIL) 10 mg tablet Take 20 mg by mouth daily.  simvastatin (ZOCOR) 40 mg tablet Take 40 mg by mouth nightly.  dulaglutide (TRULICITY) 1.5 XE/1.9 mL sub-q pen 0.5 mL by SubCUTAneous route every seven (7) days.  metFORMIN ER (GLUCOPHAGE XR) 500 mg tablet 2 pills Twice a day with meals    insulin CONCENTRATED regular (HUMULIN R) 500 unit/mL soln Inject 12-14 unit marking before breakfast, 10 unit marking at lunch, 10 unit marking at dinner with U-100 syringe    empagliflozin (JARDIANCE) 25 mg tablet Take 1 Tab by mouth every morning.  celecoxib (CELEBREX) 200 mg capsule Take  by mouth two (2) times a day.  ESTRADIOL, BULK, 1 mg by Does Not Apply route. No current facility-administered medications for this visit. No Known Allergies      Review of Systems:  - Eyes: no blurry vision no double vision  - Cardiovascular: no chest pain ,no palpitations  - Respiratory: no cough no shortness of breath  - Gastrointestinal: no dysphagia no  abdominal pain  -   -     Physical Examination:   Blood pressure 143/68, pulse 71, temperature 98.6 °F (37 °C), temperature source Oral, resp. rate 14, height 5' 10\" (1.778 m), weight 230 lb 3.2 oz (104.4 kg), SpO2 100 %. Estimated body mass index is 33.03 kg/(m^2) as calculated from the following:    Height as of this encounter: 5' 10\" (1.778 m). -   Weight as of this encounter: 230 lb 3.2 oz (104.4 kg).   - General: pleasant, no distress, good eye contact  - HEENT: no pallor, no periorbital edema, EOMI  - Neck: supple, no thyromegaly  - Cardiovascular: regular, normal rate, normal S1 and S2  - Respiratory: clear to auscultation bilaterally  - Gastrointestinal: soft, nontender, nondistended,  BS +  - Musculoskeletal[de-identified] no edema,  - Neurological:alert and oriented  - Psychiatric: normal mood and affect  - Skin: color, texture, turgor normal.     Diabetic foot exam: May 2018  Left:     Vibratory sensation normal    Filament test normal sensation with micro filament   Pulse DP: 1+    Deformities: None  Right:    Vibratory sensation normal   Filament test normal sensation with micro filament   Pulse DP: 1+   Deformities: None      Data Reviewed:     [x] Glucose records reviewed. [] See glucose records for details (to be scanned). [] A1C  [] Reviewed labs    Lab Results   Component Value Date/Time    Hemoglobin A1c 6.8 (H) 03/23/2017 08:49 AM    Hemoglobin A1c 6.6 (H) 04/21/2016 08:23 AM    Hemoglobin A1c 6.5 (H) 01/07/2016 08:20 AM    Glucose 143 (H) 03/23/2017 08:49 AM    Glucose  05/17/2018 02:04 PM    Microalb/Creat ratio (ug/mg creat.) <3.3 03/23/2017 08:49 AM    LDL, calculated 68 03/23/2017 08:49 AM    Creatinine 0.79 03/23/2017 08:49 AM    Hemoglobin A1c, External 6.7 11/04/2016      Lab Results   Component Value Date/Time    Cholesterol, total 140 03/23/2017 08:49 AM    HDL Cholesterol 39 (L) 03/23/2017 08:49 AM    LDL, calculated 68 03/23/2017 08:49 AM    Triglyceride 164 (H) 03/23/2017 08:49 AM     Lab Results   Component Value Date/Time    ALT (SGPT) 14 03/23/2017 08:49 AM    AST (SGOT) 15 03/23/2017 08:49 AM    Alk. phosphatase 95 03/23/2017 08:49 AM    Bilirubin, total 0.7 03/23/2017 08:49 AM     Lab Results   Component Value Date/Time    GFR est AA 95 03/23/2017 08:49 AM    GFR est non-AA 83 03/23/2017 08:49 AM    Creatinine 0.79 03/23/2017 08:49 AM    BUN 16 03/23/2017 08:49 AM    Sodium 143 03/23/2017 08:49 AM    Potassium 4.7 03/23/2017 08:49 AM    Chloride 101 03/23/2017 08:49 AM    CO2 25 03/23/2017 08:49 AM      No results found for: TSH, TSHEXT       Assessment/Plan:     1.  Type 2 Diabetes Mellitus with no known complications   Lab Results   Component Value Date/Time    Hemoglobin A1c 6.8 (H) 03/23/2017 08:49 AM    Hemoglobin A1c (POC) 7.7 05/17/2018 02:05 PM    Hemoglobin A1c, External 6.7 11/04/2016     She is on a very very low carbohydrate diet almost like a ketogenic diet  Eating more bassett and unsaturated fats, need to check lipid panel. When  patients are on ketogenic diet cholesterol will increase (LDL) and needs to be monitored. She went off diet and the blood glucose is increasing  Discussed about controlling the carbs, if she starts adding carb she eventually go back on insulin. Sticking to the diet is the key, the only drawback to these very very low carbohydrate diets are patients usually after 6 months to a year will not stick to this diet. Janumet 50/1000 extended release twice daily  Continue glipizide      Bydureon - had velcarey, failed Bydureon in the past .Tried Tanzeum , on Trulicity  Jardiance     FLU annually ,Pneumovax ,aspirin daily,annual eye exam,microalbumin. 2.  HTN : Continue current therapy     3. Hyperlipidemia : Continue present therapy    4. Obesity Body mass index is 33.03 kg/(m^2). Thank you for allowing me to participate in the care of this patient.     Awanda Holter, MD      Patient verbalized understanding

## 2018-05-24 LAB
CREATININE, EXTERNAL: 1
HBA1C MFR BLD HPLC: 8 %
LDL-C, EXTERNAL: 68
MICROALBUMIN UR TEST STR-MCNC: <5 MG/DL

## 2018-08-06 DIAGNOSIS — E11.65 TYPE 2 DIABETES MELLITUS WITH HYPERGLYCEMIA, WITH LONG-TERM CURRENT USE OF INSULIN (HCC): ICD-10-CM

## 2018-08-06 DIAGNOSIS — Z79.4 TYPE 2 DIABETES MELLITUS WITH HYPERGLYCEMIA, WITH LONG-TERM CURRENT USE OF INSULIN (HCC): ICD-10-CM

## 2018-08-06 DIAGNOSIS — E78.2 MIXED HYPERLIPIDEMIA: ICD-10-CM

## 2018-08-06 DIAGNOSIS — I10 ESSENTIAL HYPERTENSION: ICD-10-CM

## 2018-08-06 RX ORDER — GLIPIZIDE 5 MG/1
5-10 TABLET, FILM COATED, EXTENDED RELEASE ORAL DAILY
Qty: 180 TAB | Refills: 3 | Status: SHIPPED | OUTPATIENT
Start: 2018-08-06 | End: 2019-09-17 | Stop reason: SDUPTHER

## 2018-10-04 ENCOUNTER — OFFICE VISIT (OUTPATIENT)
Dept: ENDOCRINOLOGY | Age: 58
End: 2018-10-04

## 2018-10-04 VITALS
WEIGHT: 233.7 LBS | DIASTOLIC BLOOD PRESSURE: 66 MMHG | OXYGEN SATURATION: 99 % | RESPIRATION RATE: 14 BRPM | TEMPERATURE: 97.8 F | HEIGHT: 70 IN | SYSTOLIC BLOOD PRESSURE: 132 MMHG | BODY MASS INDEX: 33.46 KG/M2 | HEART RATE: 76 BPM

## 2018-10-04 DIAGNOSIS — Z79.4 UNCONTROLLED TYPE 2 DIABETES MELLITUS WITH HYPERGLYCEMIA, WITH LONG-TERM CURRENT USE OF INSULIN (HCC): ICD-10-CM

## 2018-10-04 DIAGNOSIS — E78.2 MIXED HYPERLIPIDEMIA: ICD-10-CM

## 2018-10-04 DIAGNOSIS — E11.65 UNCONTROLLED TYPE 2 DIABETES MELLITUS WITH HYPERGLYCEMIA, WITH LONG-TERM CURRENT USE OF INSULIN (HCC): ICD-10-CM

## 2018-10-04 DIAGNOSIS — I10 ESSENTIAL HYPERTENSION: ICD-10-CM

## 2018-10-04 DIAGNOSIS — E11.65 TYPE 2 DIABETES MELLITUS WITH HYPERGLYCEMIA, WITHOUT LONG-TERM CURRENT USE OF INSULIN (HCC): Primary | ICD-10-CM

## 2018-10-04 LAB
GLUCOSE POC: 161 MG/DL
HBA1C MFR BLD HPLC: 7.7 %

## 2018-10-04 NOTE — MR AVS SNAPSHOT
[address] [facility] [address] 
[phone] Patient: Joann Acevedo MRN: [id number] YGO:[birth year] Visit Information Date & Time Provider Department Dept. Phone Encounter #  
 10/4/2018  8:30 AM Sole Garcia MD [facility] [phone] [id number] Follow-up Instructions Return in about 4 months (around 2/4/2019). Upcoming Health Maintenance Date Due Hepatitis C Screening 1960 Pneumococcal 19-64 Medium Risk (1 of 1 - PPSV23) 6/2/1977 DTaP/Tdap/Td series (1 - Tdap) 6/2/1979 PAP AKA CERVICAL CYTOLOGY 6/2/1979 Shingrix Vaccine Age 50> (1 of 2) 6/2/2008 BREAST CANCER SCRN MAMMOGRAM 6/2/2008 FOBT Q 1 YEAR AGE 50-75 6/2/2008 MICROALBUMIN Q1 3/23/2018 LIPID PANEL Q1 3/23/2018 Influenza Age 5 to Adult 8/1/2018 EYE EXAM RETINAL OR DILATED Q1 11/13/2018 HEMOGLOBIN A1C Q6M 11/17/2018 FOOT EXAM Q1 5/19/2019 Allergies as of 10/4/2018  Review Complete On: 10/4/2018 By: Sole Garcia MD  
 No Known Allergies Current Immunizations  Never Reviewed No immunizations on file. Not reviewed this visit You Were Diagnosed With   
  
 Codes Comments Type 2 diabetes mellitus with hyperglycemia, without long-term current use of insulin (HCC)    -  Primary ICD-10-CM: E11.65 ICD-9-CM: 250.00, 790.29 Mixed hyperlipidemia     ICD-10-CM: E78.2 ICD-9-CM: 272.2 Essential hypertension     ICD-10-CM: I10 
ICD-9-CM: 401.9 Vitals BP Pulse Temp Resp Height(growth percentile) Weight(growth percentile) 132/66 (BP 1 Location: Right arm, BP Patient Position: Sitting) 76 97.8 °F (36.6 °C) (Oral) 14 5' 10\" (1.778 m) 233 lb 11.2 oz (106 kg) SpO2 BMI OB Status Smoking Status 99% 33.53 kg/m2 Hysterectomy Never Smoker Vitals History BMI and BSA Data  Body Mass Index Body Surface Area  
 33.53 kg/m 2 2.29 m 2  
  
  
 Preferred Pharmacy Pharmacy Name Phone [facility] [address], [address] [phone] Your Updated Medication List  
  
   
This list is accurate as of 10/4/18  9:20 AM.  Always use your most recent med list.  
  
  
  
  
 Blood-Glucose Meter monitoring kit Commonly known as:  FREESTYLE LITE METER Test 3 times daily Dx Code: E11.65 CeleBREX 200 mg capsule Generic drug:  celecoxib Take  by mouth two (2) times a day. cholecalciferol 1,000 unit tablet Commonly known as:  VITAMIN D3 Take 5,000 Units by mouth daily. empagliflozin 25 mg tablet Commonly known as:  Adonna Amour Take 1 Tab by mouth every morning. ESTRADIOL (BULK) 1 mg by Does Not Apply route. glipiZIDE SR 5 mg CR tablet Commonly known as:  GLUCOTROL XL Take 1-2 Tabs by mouth daily. glucose blood VI test strips strip Commonly known as:  FREESTYLE LITE STRIPS Tests 3x daily Dx Code: E11.65  
  
 insulin CONCENTRATED regular 500 unit/mL Soln Commonly known as:  HumuLIN R U-500 Inject 12-14 unit marking before breakfast, 10 unit marking at lunch, 10 unit marking at dinner with U-100 syringe Insulin Needles (Disposable) 31 gauge x 5/16\" Ndle Use to inject Victoza daily Insulin Syringe-Needle U-100 0.5 mL 31 gauge x 5/16 Syrg Commonly known as:  BD INSULIN SYRINGE ULT-FINE II  
Use to inject U-500 insulin 3x daily  
  
 lancets 28 gauge Misc Commonly known as:  FREESTYLE LANCETS Tests 3x daily Dx Code: E11.65  
  
 lisinopril 10 mg tablet Commonly known as:  Amauri Rye Take 20 mg by mouth daily. MIRALAX 17 gram packet Generic drug:  polyethylene glycol Take 17 g by mouth daily. omeprazole 20 mg capsule Commonly known as:  PRILOSEC Take 20 mg by mouth daily. SITagliptin-metFORMIN 50-1,000 mg Tm24 Commonly known as:  JANUMET XR Take 1 Tab by mouth two (2) times a day. tolterodine ER 2 mg ER capsule Commonly known as:  DETROL-LA Take 4 mg by mouth daily. ZOCOR 40 mg tablet Generic drug:  simvastatin Take 40 mg by mouth nightly. We Performed the Following AMB POC GLUCOSE, QUANTITATIVE, BLOOD [49686 CPT(R)] AMB POC HEMOGLOBIN A1C [39766 CPT(R)] Follow-up Instructions Return in about 4 months (around 2/4/2019). Introducing [facility]! Dear Nena Barron: Thank you for requesting a [employer] account. Our records indicate that you already have an active [employer] account. You can access your account anytime at [email or web address] Did you know that you can access your hospital and ER discharge instructions at any time in [employer]? You can also review all of your test results from your hospital stay or ER visit. Additional Information If you have questions, please visit the Frequently Asked Questions section of the [employer] website at [email or web address] Remember, [employer] is NOT to be used for urgent needs. For medical emergencies, dial 911. Now available from your iPhone and Android! Please provide this summary of care documentation to your next provider. Your primary care clinician is listed as Beka Area. If you have any questions after today's visit, please call [phone].

## 2018-10-04 NOTE — PROGRESS NOTES
Diego Tanner is a 59 y.o. female here for Chief Complaint Patient presents with  Diabetes Functional glucose monitor and record keeping system? - yes Eye exam within last year? - on file Foot exam within last year? - on file 1. Have you been to the ER, urgent care clinic since your last visit? Hospitalized since your last visit? -no 
 
2. Have you seen or consulted any other health care providers outside of the [address] [place] [address] since your last visit? Include any pap smears or colon screening. -PCP

## 2018-10-04 NOTE — PROGRESS NOTES
Chief Complaint Patient presents with  Diabetes History of Present Illness: Gege Moyer is a 59 y.o. female here for fu of  Type 2 Diabetes Mellitus. She was on a strict low-carb diet, [place] She is off insulin and Trulicity On Janumet and glipizide She is tired of the diet, now eating more carbs Blood glucose is increasing A1c is high The blood glucose are increasing. SMBG 3 /day ,  
 
 
 
 
DM hx Type 2 Diabetes was diagnosed in 2003 . Cardiovascular risk factors: family history, diabetes mellitus, obesity, hypertension, post-menopausal  
 
 
 
Wt Readings from Last 3 Encounters:  
10/04/18 233 lb 11.2 oz (106 kg) 05/17/18 230 lb 3.2 oz (104.4 kg) 08/02/17 241 lb 1.6 oz (109.4 kg) BP Readings from Last 3 Encounters:  
10/04/18 132/66  
05/17/18 143/68  
08/02/17 139/59 Past Medical History:  
Diagnosis Date  Diabetes mellitus  GERD (gastroesophageal reflux disease)  HTN (hypertension)  Hyperlipidemia Current Outpatient Prescriptions Medication Sig  
 glipiZIDE SR (GLUCOTROL XL) 5 mg CR tablet Take 1-2 Tabs by mouth daily.  polyethylene glycol (MIRALAX) 17 gram packet Take 17 g by mouth daily.  SITagliptin-metFORMIN (JANUMET XR) 50-1,000 mg TM24 Take 1 Tab by mouth two (2) times a day.  Blood-Glucose Meter (FREESTYLE LITE METER) monitoring kit Test 3 times daily Dx Code: E11.65  
 glucose blood VI test strips (FREESTYLE LITE STRIPS) strip Tests 3x daily Dx Code: E11.65  
 lancets (FREESTYLE LANCETS) 28 gauge misc Tests 3x daily Dx Code: E11.65  
 tolterodine ER (DETROL-LA) 2 mg ER capsule Take 4 mg by mouth daily.  cholecalciferol (VITAMIN D3) 1,000 unit tablet Take 5,000 Units by mouth daily.  omeprazole (PRILOSEC) 20 mg capsule Take 20 mg by mouth daily.  lisinopril (PRINIVIL, ZESTRIL) 10 mg tablet Take 20 mg by mouth daily.  simvastatin (ZOCOR) 40 mg tablet Take 40 mg by mouth nightly.  Insulin Syringe-Needle U-100 (BD INSULIN SYRINGE ULT-FINE II) 0.5 mL 31 gauge x 5/16 syrg Use to inject U-500 insulin 3x daily  insulin CONCENTRATED regular (HUMULIN R) 500 unit/mL soln Inject 12-14 unit marking before breakfast, 10 unit marking at lunch, 10 unit marking at dinner with U-100 syringe  empagliflozin (JARDIANCE) 25 mg tablet Take 1 Tab by mouth every morning.  Insulin Needles, Disposable, 31 gauge x 5/16\" ndle Use to inject Victoza daily  celecoxib (CELEBREX) 200 mg capsule Take  by mouth two (2) times a day.  ESTRADIOL, BULK, 1 mg by Does Not Apply route. No current facility-administered medications for this visit. No Known Allergies Review of Systems: - Eyes: no blurry vision no double vision - Cardiovascular: no chest pain ,no palpitations - Respiratory: no cough no shortness of breath 
- Gastrointestinal: no dysphagia no  abdominal pain -  
- Physical Examination: 
 Blood pressure 132/66, pulse 76, temperature 97.8 °F (36.6 °C), temperature source Oral, resp. rate 14, height 5' 10\" (1.778 m), weight 233 lb 11.2 oz (106 kg), SpO2 99 %. Estimated body mass index is 33.53 kg/(m^2) as calculated from the following: 
  Height as of this encounter: 5' 10\" (1.778 m). -   Weight as of this encounter: 233 lb 11.2 oz (106 kg). - General: pleasant, no distress, good eye contact 
- HEENT: no pallor, no periorbital edema, EOMI 
- Neck: supple, no thyromegaly - Cardiovascular: regular, normal rate, normal S1 and S2 
- Respiratory: clear to auscultation bilaterally - Gastrointestinal: soft, nontender, nondistended,  BS + 
- Musculoskeletal[de-identified] no edema, 
- Neurological:alert and oriented - Psychiatric: normal mood and affect 
- Skin: color, texture, turgor normal.  
 
Diabetic foot exam: May 2018 Left:   
 Vibratory sensation normal  
 Filament test normal sensation with micro filament  Pulse DP: 1+  
 Deformities: None Right:  
 Vibratory sensation normal 
 Filament test normal sensation with micro filament Pulse DP: 1+ Deformities: None Data Reviewed:  
 
[x] Glucose records reviewed. [] See glucose records for details (to be scanned). [] A1C [] Reviewed labs Lab Results Component Value Date/Time Hemoglobin A1c 6.8 (H) 03/23/2017 08:49 AM  
 Hemoglobin A1c 6.6 (H) 04/21/2016 08:23 AM  
 Hemoglobin A1c 6.5 (H) 01/07/2016 08:20 AM  
 Glucose 143 (H) 03/23/2017 08:49 AM  
 Glucose  10/04/2018 08:35 AM  
 Microalb/Creat ratio (ug/mg creat.) <3.3 03/23/2017 08:49 AM  
 LDL, calculated 68 03/23/2017 08:49 AM  
 Creatinine 0.79 03/23/2017 08:49 AM  
 Hemoglobin A1c, External 6.7 11/04/2016 Lab Results Component Value Date/Time Cholesterol, total 140 03/23/2017 08:49 AM  
 HDL Cholesterol 39 (L) 03/23/2017 08:49 AM  
 LDL, calculated 68 03/23/2017 08:49 AM  
 Triglyceride 164 (H) 03/23/2017 08:49 AM  
 
Lab Results Component Value Date/Time ALT (SGPT) 14 03/23/2017 08:49 AM  
 AST (SGOT) 15 03/23/2017 08:49 AM  
 Alk. phosphatase 95 03/23/2017 08:49 AM  
 Bilirubin, total 0.7 03/23/2017 08:49 AM  
 
Lab Results Component Value Date/Time GFR est AA 95 03/23/2017 08:49 AM  
 GFR est non-AA 83 03/23/2017 08:49 AM  
 Creatinine 0.79 03/23/2017 08:49 AM  
 BUN 16 03/23/2017 08:49 AM  
 Sodium 143 03/23/2017 08:49 AM  
 Potassium 4.7 03/23/2017 08:49 AM  
 Chloride 101 03/23/2017 08:49 AM  
 CO2 25 03/23/2017 08:49 AM  
  
No results found for: TSH, TSHEXT Assessment/Plan: 1. Type 2 Diabetes Mellitus with no known complications Lab Results Component Value Date/Time Hemoglobin A1c 6.8 (H) 03/23/2017 08:49 AM  
 Hemoglobin A1c (POC) 7.7 10/04/2018 08:36 AM  
 Hemoglobin A1c, External 6.7 11/04/2016 She is on a very  low carbohydrate diet almost like a ketogenic diet Eating more bassett and unsaturated fats,  
 She went off diet and the blood glucose is increasing Discussed about controlling the carbs, if she starts adding carb she eventually will go back on insulin. Sticking to the diet is the key, the only drawback to these very very low carbohydrate diets are patients usually after 6 months to a year will not stick to this diet. Janumet 50/1000 extended release twice daily Sharmin Mcgarry Wean off glipizide Bydureon - had velts, failed Bydureon in the past .Cynthia Sherman , on Trulicity Sharmin Mcgarry FLU annually ,Pneumovax ,aspirin daily,annual eye exam,microalbumin. 2.  HTN : Continue current therapy 3. Hyperlipidemia : Continue present therapy 4. Obesity Body mass index is 33.53 kg/(m^2). Thank you for allowing me to participate in the care of this patient. Celeste Pickett MD 
 
 
Patient verbalized understanding

## 2019-01-20 DIAGNOSIS — Z79.4 TYPE 2 DIABETES MELLITUS WITH HYPERGLYCEMIA, WITH LONG-TERM CURRENT USE OF INSULIN (HCC): ICD-10-CM

## 2019-01-20 DIAGNOSIS — E11.65 TYPE 2 DIABETES MELLITUS WITH HYPERGLYCEMIA, WITH LONG-TERM CURRENT USE OF INSULIN (HCC): ICD-10-CM

## 2019-01-21 RX ORDER — LANCETS 28 GAUGE
EACH MISCELLANEOUS
Qty: 300 LANCET | Refills: 3 | Status: SHIPPED | OUTPATIENT
Start: 2019-01-21 | End: 2020-04-22 | Stop reason: SDUPTHER

## 2019-02-08 ENCOUNTER — OFFICE VISIT (OUTPATIENT)
Dept: ENDOCRINOLOGY | Age: 59
End: 2019-02-08

## 2019-02-08 VITALS
TEMPERATURE: 98.1 F | WEIGHT: 229.7 LBS | DIASTOLIC BLOOD PRESSURE: 63 MMHG | SYSTOLIC BLOOD PRESSURE: 139 MMHG | HEIGHT: 70 IN | OXYGEN SATURATION: 98 % | RESPIRATION RATE: 14 BRPM | HEART RATE: 96 BPM | BODY MASS INDEX: 32.88 KG/M2

## 2019-02-08 DIAGNOSIS — E11.65 TYPE 2 DIABETES MELLITUS WITH HYPERGLYCEMIA, WITH LONG-TERM CURRENT USE OF INSULIN (HCC): Primary | ICD-10-CM

## 2019-02-08 DIAGNOSIS — Z79.4 TYPE 2 DIABETES MELLITUS WITH HYPERGLYCEMIA, WITH LONG-TERM CURRENT USE OF INSULIN (HCC): Primary | ICD-10-CM

## 2019-02-08 DIAGNOSIS — I10 ESSENTIAL HYPERTENSION: ICD-10-CM

## 2019-02-08 DIAGNOSIS — E11.65 UNCONTROLLED TYPE 2 DIABETES MELLITUS WITH HYPERGLYCEMIA (HCC): Primary | ICD-10-CM

## 2019-02-08 DIAGNOSIS — E78.2 MIXED HYPERLIPIDEMIA: ICD-10-CM

## 2019-02-08 PROBLEM — M75.40 IMPINGEMENT SYNDROME OF SHOULDER REGION: Status: ACTIVE | Noted: 2018-12-20

## 2019-02-08 PROBLEM — M75.02 ADHESIVE CAPSULITIS OF LEFT SHOULDER: Status: ACTIVE | Noted: 2018-12-20

## 2019-02-08 RX ORDER — IBUPROFEN 800 MG/1
800 TABLET ORAL
COMMUNITY
End: 2020-10-19

## 2019-02-08 RX ORDER — GABAPENTIN 300 MG/1
300 CAPSULE ORAL
COMMUNITY
End: 2020-10-19

## 2019-02-08 RX ORDER — METFORMIN HYDROCHLORIDE 500 MG/1
1000 TABLET, EXTENDED RELEASE ORAL 2 TIMES DAILY
Qty: 360 TAB | Refills: 3 | Status: SHIPPED | OUTPATIENT
Start: 2019-02-08 | End: 2020-04-22 | Stop reason: SDUPTHER

## 2019-02-08 NOTE — PROGRESS NOTES
Chief Complaint Patient presents with  Diabetes History of Present Illness: Gege Scott is a 59 y.o. female here for fu of  Type 2 Diabetes Mellitus. She was on a strict low-carb diet, [place]. She is off insulin and Trulicity On Janumet and glipizide She is not on very low carb diet as she was in the past when she came off of insulin. Her A1c was controlled then, now off the diet, activity as also decreased Has shoulder pain and inability to exercise reportedly A1c is increasing Checking blood glucose once daily fasting DM hx Type 2 Diabetes was diagnosed in 2003 . Cardiovascular risk factors: family history, diabetes mellitus, obesity, hypertension, post-menopausal  
 
 
 
Wt Readings from Last 3 Encounters:  
02/08/19 229 lb 11.2 oz (104.2 kg) 10/04/18 233 lb 11.2 oz (106 kg) 05/17/18 230 lb 3.2 oz (104.4 kg) BP Readings from Last 3 Encounters:  
02/08/19 139/63  
10/04/18 132/66  
05/17/18 143/68 Past Medical History:  
Diagnosis Date  Diabetes mellitus  GERD (gastroesophageal reflux disease)  HTN (hypertension)  Hyperlipidemia Current Outpatient Medications Medication Sig  
 gabapentin (NEURONTIN) 300 mg capsule Take 300 mg by mouth nightly.  ibuprofen (MOTRIN) 800 mg tablet Take 800 mg by mouth every eight (8) hours as needed for Pain.  glucose blood VI test strips (FREESTYLE LITE STRIPS) strip Tests 3x daily Dx Code: E11.65  
 lancets (FREESTYLE LANCETS) 28 gauge misc Tests 3x daily Dx Code: E11.65  
 empagliflozin (JARDIANCE) 25 mg tablet Take 1 Tab by mouth every morning.  glipiZIDE SR (GLUCOTROL XL) 5 mg CR tablet Take 1-2 Tabs by mouth daily.  polyethylene glycol (MIRALAX) 17 gram packet Take 17 g by mouth daily.  SITagliptin-metFORMIN (JANUMET XR) 50-1,000 mg TM24 Take 1 Tab by mouth two (2) times a day.   
 Blood-Glucose Meter (FREESTYLE LITE METER) monitoring kit Test 3 times daily Dx Code: E11.65  
 tolterodine ER (DETROL-LA) 2 mg ER capsule Take 4 mg by mouth daily.  cholecalciferol (VITAMIN D3) 1,000 unit tablet Take 5,000 Units by mouth daily.  omeprazole (PRILOSEC) 20 mg capsule Take 20 mg by mouth daily.  lisinopril (PRINIVIL, ZESTRIL) 10 mg tablet Take 20 mg by mouth daily.  simvastatin (ZOCOR) 40 mg tablet Take 40 mg by mouth nightly.  Insulin Syringe-Needle U-100 (BD INSULIN SYRINGE ULT-FINE II) 0.5 mL 31 gauge x 5/16 syrg Use to inject U-500 insulin 3x daily  insulin CONCENTRATED regular (HUMULIN R) 500 unit/mL soln Inject 12-14 unit marking before breakfast, 10 unit marking at lunch, 10 unit marking at dinner with U-100 syringe  Insulin Needles, Disposable, 31 gauge x 5/16\" ndle Use to inject Victoza daily  celecoxib (CELEBREX) 200 mg capsule Take  by mouth two (2) times a day.  ESTRADIOL, BULK, 1 mg by Does Not Apply route. No current facility-administered medications for this visit. No Known Allergies Review of Systems: - Eyes: no blurry vision no double vision - Cardiovascular: no chest pain ,no palpitations - Respiratory: no cough no shortness of breath 
- Gastrointestinal: no dysphagia no  abdominal pain -  
- Physical Examination: 
 Blood pressure 139/63, pulse 96, temperature 98.1 °F (36.7 °C), temperature source Oral, resp. rate 14, height 5' 10\" (1.778 m), weight 229 lb 11.2 oz (104.2 kg), SpO2 98 %. Estimated body mass index is 32.96 kg/m² as calculated from the following: 
  Height as of this encounter: 5' 10\" (1.778 m). -   Weight as of this encounter: 229 lb 11.2 oz (104.2 kg). - General: pleasant, no distress, good eye contact 
- HEENT: no pallor, no periorbital edema, EOMI 
- Neck: supple, no thyromegaly - Cardiovascular: regular, normal rate, normal S1 and S2 
- Respiratory: clear to auscultation bilaterally - Gastrointestinal: soft, nontender, nondistended,  BS + 
- Musculoskeletal[de-identified] no edema, 
 - Neurological:alert and oriented - Psychiatric: normal mood and affect 
- Skin: color, texture, turgor normal.  
 
Diabetic foot exam: May 2018 Left:   
 Vibratory sensation normal  
 Filament test normal sensation with micro filament Pulse DP: 1+ Deformities: None Right:  
 Vibratory sensation normal 
 Filament test normal sensation with micro filament Pulse DP: 1+ Deformities: None Data Reviewed:  
 
[x] Glucose records reviewed. [] See glucose records for details (to be scanned). [] A1C [] Reviewed labs Lab Results Component Value Date/Time Hemoglobin A1c 8.0 (H) 02/01/2019 08:20 AM  
 Hemoglobin A1c 6.8 (H) 03/23/2017 08:49 AM  
 Hemoglobin A1c 6.6 (H) 04/21/2016 08:23 AM  
 Glucose 153 (H) 02/01/2019 08:20 AM  
 Glucose  10/04/2018 08:35 AM  
 Microalb/Creat ratio (ug/mg creat.) <4.2 02/01/2019 08:20 AM  
 LDL, calculated 58 02/01/2019 08:20 AM  
 Creatinine 1.05 (H) 02/01/2019 08:20 AM  
 Hemoglobin A1c, External 8.0 05/24/2018 Hemoglobin A1c, External 6.7 11/04/2016 Lab Results Component Value Date/Time Cholesterol, total 157 02/01/2019 08:20 AM  
 HDL Cholesterol 33 (L) 02/01/2019 08:20 AM  
 LDL, calculated 58 02/01/2019 08:20 AM  
 Triglyceride 329 (H) 02/01/2019 08:20 AM  
 
Lab Results Component Value Date/Time ALT (SGPT) 14 02/01/2019 08:20 AM  
 AST (SGOT) 17 02/01/2019 08:20 AM  
 Alk. phosphatase 85 02/01/2019 08:20 AM  
 Bilirubin, total 0.5 02/01/2019 08:20 AM  
 
Lab Results Component Value Date/Time GFR est AA 67 02/01/2019 08:20 AM  
 GFR est non-AA 58 (L) 02/01/2019 08:20 AM  
 Creatinine 1.05 (H) 02/01/2019 08:20 AM  
 BUN 20 02/01/2019 08:20 AM  
 Sodium 145 (H) 02/01/2019 08:20 AM  
 Potassium 4.7 02/01/2019 08:20 AM  
 Chloride 107 (H) 02/01/2019 08:20 AM  
 CO2 25 02/01/2019 08:20 AM  
  
No results found for: TSH, TSHEXT Assessment/Plan: 1. Type 2 Diabetes Mellitus with no known complications Lab Results Component Value Date/Time Hemoglobin A1c 8.0 (H) 02/01/2019 08:20 AM  
 Hemoglobin A1c (POC) 7.7 10/04/2018 08:36 AM  
 Hemoglobin A1c, External 8.0 05/24/2018 She is not on low-carb diet and hence A1c is 8 which is the highest in the last 4 years She does not want to go back on the insulin Discussed about controlling the carbs, if she starts adding carb she eventually will go back on insulin. Sticking to the diet is the key, the only drawback to these very very low carbohydrate diets are patients usually after 6 months to a year will not stick to this diet. Trulicity, metformin, Jardiance Wean off glipizide Bydureon - had velcarey, failed Bydureon in the past .Manju Stroud , on Trulicity FLU annually ,Pneumovax ,aspirin daily,annual eye exam,microalbumin. 2.  HTN : Continue current therapy 3. Hyperlipidemia : Continue present therapy 4. Obesity Body mass index is 32.96 kg/m². Thank you for allowing me to participate in the care of this patient. Rd Moreno MD 
 
 
Patient verbalized understanding

## 2019-02-08 NOTE — PROGRESS NOTES
Velia Johnson is a 59 y.o. female here for Chief Complaint Patient presents with  Diabetes Functional glucose monitor and record keeping system? -yes Eye exam within last year? - on file Foot exam within last year? - on file 1. Have you been to the ER, urgent care clinic since your last visit? Hospitalized since your last visit? -no 
 
2. Have you seen or consulted any other health care providers outside of the [address] [place] [address] since your last visit? Include any pap smears or colon screening. -PCP

## 2019-02-15 ENCOUNTER — TELEPHONE (OUTPATIENT)
Dept: ENDOCRINOLOGY | Age: 59
End: 2019-02-15

## 2019-02-15 NOTE — TELEPHONE ENCOUNTER
----- Message from Evelina Justin sent at 2/15/2019 12:33 PM EST -----  Regarding: Dr. Meier Legacy: [phone]  Ruthie Maharaj with [facility] is requesting a return call concerning an unknown medication change for the pt. Rx Metformin XR with a total 2000 mg per day or Rx Janumet XR  mg. Which contain 2000 mg of the metformin. Pharmacist would like to verify which one the pt should be taking.

## 2019-02-15 NOTE — TELEPHONE ENCOUNTER
Confirmed with Mi Lard that Metformin XR is what has been prescribed and Janumet XR is to be discontinued.

## 2019-03-07 DIAGNOSIS — E11.65 UNCONTROLLED TYPE 2 DIABETES MELLITUS WITH HYPERGLYCEMIA (HCC): ICD-10-CM

## 2019-06-04 DIAGNOSIS — E78.2 MIXED HYPERLIPIDEMIA: ICD-10-CM

## 2019-06-04 DIAGNOSIS — E11.65 TYPE 2 DIABETES MELLITUS WITH HYPERGLYCEMIA, WITH LONG-TERM CURRENT USE OF INSULIN (HCC): ICD-10-CM

## 2019-06-04 DIAGNOSIS — I10 ESSENTIAL HYPERTENSION: ICD-10-CM

## 2019-06-04 DIAGNOSIS — Z79.4 TYPE 2 DIABETES MELLITUS WITH HYPERGLYCEMIA, WITH LONG-TERM CURRENT USE OF INSULIN (HCC): ICD-10-CM

## 2019-06-05 LAB
ALBUMIN SERPL-MCNC: 4.3 G/DL (ref 3.6–4.8)
ALBUMIN/GLOB SERPL: 1.7 {RATIO} (ref 1.2–2.2)
ALP SERPL-CCNC: 95 IU/L (ref 39–117)
ALT SERPL-CCNC: 31 IU/L (ref 0–32)
AST SERPL-CCNC: 24 IU/L (ref 0–40)
BILIRUB SERPL-MCNC: 0.5 MG/DL (ref 0–1.2)
BUN SERPL-MCNC: 24 MG/DL (ref 8–27)
BUN/CREAT SERPL: 24 (ref 12–28)
CALCIUM SERPL-MCNC: 9.4 MG/DL (ref 8.7–10.3)
CHLORIDE SERPL-SCNC: 107 MMOL/L (ref 96–106)
CHOLEST SERPL-MCNC: 158 MG/DL (ref 100–199)
CO2 SERPL-SCNC: 24 MMOL/L (ref 20–29)
CREAT SERPL-MCNC: 1 MG/DL (ref 0.57–1)
EST. AVERAGE GLUCOSE BLD GHB EST-MCNC: 157 MG/DL
GLOBULIN SER CALC-MCNC: 2.5 G/DL (ref 1.5–4.5)
GLUCOSE SERPL-MCNC: 129 MG/DL (ref 65–99)
HBA1C MFR BLD: 7.1 % (ref 4.8–5.6)
HDLC SERPL-MCNC: 32 MG/DL
INTERPRETATION, 910389: NORMAL
LDLC SERPL CALC-MCNC: 57 MG/DL (ref 0–99)
Lab: NORMAL
POTASSIUM SERPL-SCNC: 4.9 MMOL/L (ref 3.5–5.2)
PROT SERPL-MCNC: 6.8 G/DL (ref 6–8.5)
SODIUM SERPL-SCNC: 146 MMOL/L (ref 134–144)
TRIGL SERPL-MCNC: 345 MG/DL (ref 0–149)
VLDLC SERPL CALC-MCNC: 69 MG/DL (ref 5–40)

## 2019-06-12 ENCOUNTER — OFFICE VISIT (OUTPATIENT)
Dept: ENDOCRINOLOGY | Age: 59
End: 2019-06-12

## 2019-06-12 VITALS
WEIGHT: 225.3 LBS | RESPIRATION RATE: 14 BRPM | BODY MASS INDEX: 32.25 KG/M2 | TEMPERATURE: 97.4 F | HEIGHT: 70 IN | HEART RATE: 81 BPM | DIASTOLIC BLOOD PRESSURE: 55 MMHG | OXYGEN SATURATION: 100 % | SYSTOLIC BLOOD PRESSURE: 133 MMHG

## 2019-06-12 DIAGNOSIS — Z79.4 TYPE 2 DIABETES MELLITUS WITH HYPERGLYCEMIA, WITH LONG-TERM CURRENT USE OF INSULIN (HCC): Primary | ICD-10-CM

## 2019-06-12 DIAGNOSIS — E11.65 TYPE 2 DIABETES MELLITUS WITH HYPERGLYCEMIA, WITH LONG-TERM CURRENT USE OF INSULIN (HCC): Primary | ICD-10-CM

## 2019-06-12 DIAGNOSIS — E78.2 MIXED HYPERLIPIDEMIA: ICD-10-CM

## 2019-06-12 DIAGNOSIS — I10 ESSENTIAL HYPERTENSION: ICD-10-CM

## 2019-06-12 RX ORDER — AMOXICILLIN AND CLAVULANATE POTASSIUM 875; 125 MG/1; MG/1
TABLET, FILM COATED ORAL 2 TIMES DAILY
COMMUNITY
End: 2020-02-27 | Stop reason: ALTCHOICE

## 2019-06-12 NOTE — LETTER
6/12/19 Patient: Jessee Patricia YOB: 1960 Date of Visit: 6/12/2019 Guevara Lindo NP 
[address] [address] VIA Facsimile: [phone] Dear Guevara Lindo NP, Thank you for referring Ms. Gege Salguero to [id number] [place] [address] for evaluation. My notes for this consultation are attached. If you have questions, please do not hesitate to call me. I look forward to following your patient along with you. Sincerely, Loyda Barger MD

## 2019-06-12 NOTE — PROGRESS NOTES
Niraj Dean is a 62 y.o. female here for   Chief Complaint   Patient presents with    Diabetes       Functional glucose monitor and record keeping system? - yes  Eye exam within last year? - on file   Foot exam within last year? - due    1. Have you been to the ER, urgent care clinic since your last visit? Hospitalized since your last visit? - ER for colitis    2. Have you seen or consulted any other health care providers outside of the [address] [place] [address] since your last visit?   Include any pap smears or colon screening.- PCP

## 2019-06-12 NOTE — PROGRESS NOTES
Chief Complaint   Patient presents with    Diabetes       History of Present Illness: Armando De Leon is a 62 y.o. female here for fu of  Type 2 Diabetes Mellitus. She has frozen shoulder, status post steroid injection  Tolerating Trulicity  Lost some weight  Checking blood glucose once daily fasting        DM hx  Type 2 Diabetes was diagnosed in 2003 . Cardiovascular risk factors: family history, diabetes mellitus, obesity, hypertension, post-menopausal         Wt Readings from Last 3 Encounters:   06/12/19 225 lb 4.8 oz (102.2 kg)   02/08/19 229 lb 11.2 oz (104.2 kg)   10/04/18 233 lb 11.2 oz (106 kg)       BP Readings from Last 3 Encounters:   06/12/19 133/55   02/08/19 139/63   10/04/18 132/66       Past Medical History:   Diagnosis Date    Diabetes mellitus     GERD (gastroesophageal reflux disease)     HTN (hypertension)     Hyperlipidemia      Current Outpatient Medications   Medication Sig    amoxicillin-clavulanate (AUGMENTIN) 875-125 mg per tablet Take  by mouth two (2) times a day.  ibuprofen (MOTRIN) 800 mg tablet Take 800 mg by mouth every eight (8) hours as needed for Pain.  dulaglutide (TRULICITY) 1.5 BV/8.4 mL sub-q pen 0.5 mL by SubCUTAneous route every seven (7) days.  metFORMIN ER (GLUCOPHAGE XR) 500 mg tablet Take 2 Tabs by mouth two (2) times a day.  glucose blood VI test strips (FREESTYLE LITE STRIPS) strip Tests 3x daily Dx Code: E11.65    lancets (FREESTYLE LANCETS) 28 gauge misc Tests 3x daily Dx Code: E11.65    empagliflozin (JARDIANCE) 25 mg tablet Take 1 Tab by mouth every morning.  glipiZIDE SR (GLUCOTROL XL) 5 mg CR tablet Take 1-2 Tabs by mouth daily.  polyethylene glycol (MIRALAX) 17 gram packet Take 17 g by mouth daily.  Blood-Glucose Meter (FREESTYLE LITE METER) monitoring kit Test 3 times daily Dx Code: E11.65    tolterodine ER (DETROL LA) 4 mg ER capsule Take 4 mg by mouth daily.     cholecalciferol, VITAMIN D3, (VITAMIN D3) 5,000 unit tab tablet Take 5,000 Units by mouth daily.  omeprazole (PRILOSEC) 20 mg capsule Take 20 mg by mouth daily.  lisinopril (PRINIVIL, ZESTRIL) 20 mg tablet Take 20 mg by mouth daily.  simvastatin (ZOCOR) 40 mg tablet Take 40 mg by mouth nightly.  gabapentin (NEURONTIN) 300 mg capsule Take 300 mg by mouth nightly. No current facility-administered medications for this visit. No Known Allergies      Review of Systems:  - Eyes: no blurry vision no double vision  - Cardiovascular: no chest pain ,no palpitations  - Respiratory: no cough no shortness of breath  - Gastrointestinal: no dysphagia no  abdominal pain  -   -     Physical Examination:   Blood pressure 133/55, pulse 81, temperature 97.4 °F (36.3 °C), temperature source Oral, resp. rate 14, height 5' 10\" (1.778 m), weight 225 lb 4.8 oz (102.2 kg), SpO2 100 %. Estimated body mass index is 32.33 kg/m² as calculated from the following:    Height as of this encounter: 5' 10\" (1.778 m). -   Weight as of this encounter: 225 lb 4.8 oz (102.2 kg).   - General: pleasant, no distress, good eye contact  - HEENT: no pallor, no periorbital edema, EOMI  - Neck: supple, no thyromegaly  - Cardiovascular: regular, normal rate, normal S1 and S2  - Respiratory: clear to auscultation bilaterally  - Gastrointestinal: soft, nontender, nondistended,  BS +  - Musculoskeletal[de-identified] no edema,  - Neurological:alert and oriented  - Psychiatric: normal mood and affect  - Skin: color, texture, turgor normal.     Diabetic foot exam: June 2019   Left:     Vibratory sensation normal    Filament test normal sensation with micro filament   Pulse DP: 1+    Deformities: None  Right:    Vibratory sensation normal   Filament test normal sensation with micro filament   Pulse DP: 1+   Deformities: None      Data Reviewed:         Lab Results   Component Value Date/Time    Hemoglobin A1c 7.1 (H) 06/04/2019 08:25 AM    Hemoglobin A1c 8.0 (H) 02/01/2019 08:20 AM    Hemoglobin A1c 6.8 (H) 03/23/2017 08:49 AM    Glucose 129 (H) 06/04/2019 08:25 AM    Glucose  10/04/2018 08:35 AM    Microalb/Creat ratio (ug/mg creat.) <4.2 02/01/2019 08:20 AM    LDL, calculated 57 06/04/2019 08:25 AM    Creatinine 1.00 06/04/2019 08:25 AM    Hemoglobin A1c, External 8.0 05/24/2018    Hemoglobin A1c, External 6.7 11/04/2016      Lab Results   Component Value Date/Time    Cholesterol, total 158 06/04/2019 08:25 AM    HDL Cholesterol 32 (L) 06/04/2019 08:25 AM    LDL, calculated 57 06/04/2019 08:25 AM    Triglyceride 345 (H) 06/04/2019 08:25 AM     Lab Results   Component Value Date/Time    ALT (SGPT) 31 06/04/2019 08:25 AM    AST (SGOT) 24 06/04/2019 08:25 AM    Alk. phosphatase 95 06/04/2019 08:25 AM    Bilirubin, total 0.5 06/04/2019 08:25 AM     Lab Results   Component Value Date/Time    GFR est AA 70 06/04/2019 08:25 AM    GFR est non-AA 61 06/04/2019 08:25 AM    Creatinine 1.00 06/04/2019 08:25 AM    BUN 24 06/04/2019 08:25 AM    Sodium 146 (H) 06/04/2019 08:25 AM    Potassium 4.9 06/04/2019 08:25 AM    Chloride 107 (H) 06/04/2019 08:25 AM    CO2 24 06/04/2019 08:25 AM      No results found for: TSH, TSHEXT       Assessment/Plan:     1. Type 2 Diabetes Mellitus with no known complications   Lab Results   Component Value Date/Time    Hemoglobin A1c 7.1 (H) 06/04/2019 08:25 AM    Hemoglobin A1c (POC) 7.7 10/04/2018 08:36 AM    Hemoglobin A1c, External 8.0 05/24/2018     Control is improved  Discussed about controlling the carbs, if she starts adding carb she eventually will go back on insulin. Sticking to the diet is the key, the only drawback to these very very low carbohydrate diets are patients usually have difficulty sticking to this diet long-term  Trulicity, metformin, Jardiance  Wean off glipizide      Bydureon - had velts, failed Bydureon in the past .Tried Tanzeum , on Trulicity     FLU annually ,Pneumovax ,aspirin daily,annual eye exam,microalbumin. 2.  HTN : Continue current therapy     3.  Hyperlipidemia : Continue present therapy    4. Obesity Body mass index is 32.33 kg/m². Thank you for allowing me to participate in the care of this patient.     Macarena Pearson MD      Patient verbalized understanding

## 2019-09-17 DIAGNOSIS — E78.2 MIXED HYPERLIPIDEMIA: ICD-10-CM

## 2019-09-17 DIAGNOSIS — E11.65 TYPE 2 DIABETES MELLITUS WITH HYPERGLYCEMIA, WITH LONG-TERM CURRENT USE OF INSULIN (HCC): ICD-10-CM

## 2019-09-17 DIAGNOSIS — Z79.4 TYPE 2 DIABETES MELLITUS WITH HYPERGLYCEMIA, WITH LONG-TERM CURRENT USE OF INSULIN (HCC): ICD-10-CM

## 2019-09-17 DIAGNOSIS — I10 ESSENTIAL HYPERTENSION: ICD-10-CM

## 2019-09-17 RX ORDER — GLIPIZIDE 5 MG/1
5-10 TABLET, FILM COATED, EXTENDED RELEASE ORAL DAILY
Qty: 180 TAB | Refills: 3 | Status: SHIPPED | OUTPATIENT
Start: 2019-09-17 | End: 2020-09-22 | Stop reason: SDUPTHER

## 2019-09-23 ENCOUNTER — OP HISTORICAL/CONVERTED ENCOUNTER (OUTPATIENT)
Dept: OTHER | Age: 59
End: 2019-09-23

## 2019-10-15 NOTE — PROGRESS NOTES
Jersey Mays is a 62 y.o. female here for   Chief Complaint   Patient presents with    Diabetes       Functional glucose monitor and record keeping system? -yes   Eye exam within last year? - on file  Foot exam within last year? - on file    1. Have you been to the ER, urgent care clinic since your last visit? Hospitalized since your last visit? -ER [facility] and one in [place] both for Colitis    2. Have you seen or consulted any other health care providers outside of the [address] [place] [address] since your last visit? Include any pap smears or colon screening. -PCP and [facility] for UTI

## 2019-10-16 ENCOUNTER — OFFICE VISIT (OUTPATIENT)
Dept: ENDOCRINOLOGY | Age: 59
End: 2019-10-16

## 2019-10-16 VITALS
BODY MASS INDEX: 31.5 KG/M2 | RESPIRATION RATE: 14 BRPM | HEART RATE: 89 BPM | TEMPERATURE: 97 F | HEIGHT: 70 IN | OXYGEN SATURATION: 99 % | DIASTOLIC BLOOD PRESSURE: 75 MMHG | SYSTOLIC BLOOD PRESSURE: 135 MMHG | WEIGHT: 220 LBS

## 2019-10-16 DIAGNOSIS — E11.65 TYPE 2 DIABETES MELLITUS WITH HYPERGLYCEMIA, WITH LONG-TERM CURRENT USE OF INSULIN (HCC): Primary | ICD-10-CM

## 2019-10-16 DIAGNOSIS — E78.2 MIXED HYPERLIPIDEMIA: ICD-10-CM

## 2019-10-16 DIAGNOSIS — N17.9 ACUTE RENAL FAILURE, UNSPECIFIED ACUTE RENAL FAILURE TYPE (HCC): ICD-10-CM

## 2019-10-16 DIAGNOSIS — Z79.4 TYPE 2 DIABETES MELLITUS WITH HYPERGLYCEMIA, WITH LONG-TERM CURRENT USE OF INSULIN (HCC): Primary | ICD-10-CM

## 2019-10-16 DIAGNOSIS — I10 ESSENTIAL HYPERTENSION: ICD-10-CM

## 2019-10-16 DIAGNOSIS — E11.40 TYPE 2 DIABETES MELLITUS WITH DIABETIC NEUROPATHY, WITHOUT LONG-TERM CURRENT USE OF INSULIN (HCC): ICD-10-CM

## 2019-10-16 LAB
GLUCOSE POC: 135 MG/DL
HBA1C MFR BLD HPLC: 7.3 %

## 2019-10-16 NOTE — LETTER
10/16/19 Patient: Chiqui Lomeli YOB: 1960 Date of Visit: 10/16/2019 Amira Rasmussen NP 
[address] [address] VIA Facsimile: [phone] Dear Amira Rasmussen NP, Thank you for referring Ms. Gege Curtis to [id number] [place] [address] for evaluation. My notes for this consultation are attached. If you have questions, please do not hesitate to call me. I look forward to following your patient along with you. Sincerely, Lj Garcia MD

## 2019-10-16 NOTE — PROGRESS NOTES
Chief Complaint   Patient presents with    Diabetes       History of Present Illness: Bean Pryor is a 62 y.o. female here for fu of  Type 2 Diabetes Mellitus. Recent changes in the kidney function, had diverticulitis, UTI, was on antibiotics. Was on NSAIDs  Seeing nephrologist today  Tolerating Trulicity  Lost some weight  Checking blood glucose once daily fasting        DM hx  Type 2 Diabetes was diagnosed in 2003 . Cardiovascular risk factors: family history, diabetes mellitus, obesity, hypertension, post-menopausal         Wt Readings from Last 3 Encounters:   10/16/19 220 lb (99.8 kg)   06/12/19 225 lb 4.8 oz (102.2 kg)   02/08/19 229 lb 11.2 oz (104.2 kg)       BP Readings from Last 3 Encounters:   10/16/19 135/75   06/12/19 133/55   02/08/19 139/63       Past Medical History:   Diagnosis Date    Diabetes mellitus     GERD (gastroesophageal reflux disease)     HTN (hypertension)     Hyperlipidemia      Current Outpatient Medications   Medication Sig    glipiZIDE SR (GLUCOTROL XL) 5 mg CR tablet Take 1-2 Tabs by mouth daily.  amoxicillin-clavulanate (AUGMENTIN) 875-125 mg per tablet Take  by mouth two (2) times a day.  ibuprofen (MOTRIN) 800 mg tablet Take 800 mg by mouth every eight (8) hours as needed for Pain.  dulaglutide (TRULICITY) 1.5 UZ/7.6 mL sub-q pen 0.5 mL by SubCUTAneous route every seven (7) days.  metFORMIN ER (GLUCOPHAGE XR) 500 mg tablet Take 2 Tabs by mouth two (2) times a day.  glucose blood VI test strips (FREESTYLE LITE STRIPS) strip Tests 3x daily Dx Code: E11.65    lancets (FREESTYLE LANCETS) 28 gauge misc Tests 3x daily Dx Code: E11.65    empagliflozin (JARDIANCE) 25 mg tablet Take 1 Tab by mouth every morning.  polyethylene glycol (MIRALAX) 17 gram packet Take 17 g by mouth as needed.     Blood-Glucose Meter (FREESTYLE LITE METER) monitoring kit Test 3 times daily Dx Code: E11.65    tolterodine ER (DETROL LA) 4 mg ER capsule Take 4 mg by mouth daily.    cholecalciferol, VITAMIN D3, (VITAMIN D3) 5,000 unit tab tablet Take 5,000 Units by mouth daily.  omeprazole (PRILOSEC) 20 mg capsule Take 20 mg by mouth daily.  lisinopril (PRINIVIL, ZESTRIL) 20 mg tablet Take 20 mg by mouth daily.  simvastatin (ZOCOR) 40 mg tablet Take 40 mg by mouth nightly.  gabapentin (NEURONTIN) 300 mg capsule Take 300 mg by mouth nightly. No current facility-administered medications for this visit. No Known Allergies      Review of Systems:  - Eyes: no blurry vision no double vision  - Cardiovascular: no chest pain ,no palpitations  - Respiratory: no cough no shortness of breath  - Gastrointestinal: no dysphagia no  abdominal pain  -   -     Physical Examination:   Blood pressure 135/75, pulse 89, temperature 97 °F (36.1 °C), temperature source Oral, resp. rate 14, height 5' 10\" (1.778 m), weight 220 lb (99.8 kg), SpO2 99 %. Estimated body mass index is 31.57 kg/m² as calculated from the following:    Height as of this encounter: 5' 10\" (1.778 m). -   Weight as of this encounter: 220 lb (99.8 kg).   - General: pleasant, no distress, good eye contact  - HEENT: no pallor, no periorbital edema, EOMI  - Neck: supple, no thyromegaly  - Cardiovascular: regular, normal rate, normal S1 and S2  - Respiratory: clear to auscultation bilaterally  - Gastrointestinal: soft, nontender, nondistended,  BS +  - Musculoskeletal[de-identified] no edema,  - Neurological:alert and oriented  - Psychiatric: normal mood and affect  - Skin: color, texture, turgor normal.     Diabetic foot exam: June 2019   Left:     Vibratory sensation normal    Filament test normal sensation with micro filament   Pulse DP: 1+    Deformities: None  Right:    Vibratory sensation normal   Filament test normal sensation with micro filament   Pulse DP: 1+   Deformities: None      Data Reviewed:         Lab Results   Component Value Date/Time    Hemoglobin A1c 7.1 (H) 06/04/2019 08:25 AM    Hemoglobin A1c 8.0 (H) 02/01/2019 08:20 AM    Hemoglobin A1c 6.8 (H) 03/23/2017 08:49 AM    Glucose 129 (H) 06/04/2019 08:25 AM    Glucose  10/16/2019 08:39 AM    Microalb/Creat ratio (ug/mg creat.) <4.2 02/01/2019 08:20 AM    LDL, calculated 57 06/04/2019 08:25 AM    Creatinine 1.00 06/04/2019 08:25 AM    Hemoglobin A1c, External 8.0 05/24/2018    Hemoglobin A1c, External 6.7 11/04/2016      Lab Results   Component Value Date/Time    Cholesterol, total 158 06/04/2019 08:25 AM    HDL Cholesterol 32 (L) 06/04/2019 08:25 AM    LDL, calculated 57 06/04/2019 08:25 AM    Triglyceride 345 (H) 06/04/2019 08:25 AM     Lab Results   Component Value Date/Time    ALT (SGPT) 31 06/04/2019 08:25 AM    AST (SGOT) 24 06/04/2019 08:25 AM    Alk. phosphatase 95 06/04/2019 08:25 AM    Bilirubin, total 0.5 06/04/2019 08:25 AM     Lab Results   Component Value Date/Time    GFR est AA 70 06/04/2019 08:25 AM    GFR est non-AA 61 06/04/2019 08:25 AM    Creatinine 1.00 06/04/2019 08:25 AM    BUN 24 06/04/2019 08:25 AM    Sodium 146 (H) 06/04/2019 08:25 AM    Potassium 4.9 06/04/2019 08:25 AM    Chloride 107 (H) 06/04/2019 08:25 AM    CO2 24 06/04/2019 08:25 AM      No results found for: TSH, TSHEXT       Assessment/Plan:     1. Type 2 Diabetes Mellitus with no known complications   Lab Results   Component Value Date/Time    Hemoglobin A1c 7.1 (H) 06/04/2019 08:25 AM    Hemoglobin A1c (POC) 7.3 10/16/2019 08:39 AM    Hemoglobin A1c, External 8.0 05/24/2018     Stable  Discussed about controlling the carbs, if she starts adding carb she eventually will go back on insulin. Sticking to the diet is the key, the only drawback to these very very low carbohydrate diets are patients usually have difficulty sticking to this diet long-term  Trulicity, metformin, Jardiance  Wean off glipizide      Bydureon - had velts, failed Bydureon in the past .Tried Tanzeum , on Trulicity     FLU annually ,Pneumovax ,aspirin daily,annual eye exam,microalbumin.     2.  HTN : Continue current therapy     3. Hyperlipidemia : Continue present therapy    4. Obesity Body mass index is 31.57 kg/m². ARF: Could be due to NSAIDs, illness, UTI, antibiotics  Recheck labs today  If GFR is very low, need to hold metformin as well as Jardiance which was discussed with the patient      Thank you for allowing me to participate in the care of this patient.     Kartik Gil MD      Patient verbalized understanding

## 2019-10-17 LAB
BUN SERPL-MCNC: 19 MG/DL (ref 8–27)
BUN/CREAT SERPL: 19 (ref 12–28)
CALCIUM SERPL-MCNC: 9.9 MG/DL (ref 8.7–10.3)
CHLORIDE SERPL-SCNC: 101 MMOL/L (ref 96–106)
CO2 SERPL-SCNC: 23 MMOL/L (ref 20–29)
CREAT SERPL-MCNC: 1 MG/DL (ref 0.57–1)
GLUCOSE SERPL-MCNC: 128 MG/DL (ref 65–99)
POTASSIUM SERPL-SCNC: 4.6 MMOL/L (ref 3.5–5.2)
SODIUM SERPL-SCNC: 142 MMOL/L (ref 134–144)

## 2019-10-17 NOTE — PROGRESS NOTES
Kidneys have recovered, it was all because of the illness.   She has completely normal kidney function according to the test

## 2019-10-18 ENCOUNTER — TELEPHONE (OUTPATIENT)
Dept: ENDOCRINOLOGY | Age: 59
End: 2019-10-18

## 2019-10-18 NOTE — TELEPHONE ENCOUNTER
----- Message from Jersey Dawson MD sent at 10/17/2019  6:03 PM EDT -----  Kidneys have recovered, it was all because of the illness.   She has completely normal kidney function according to the test

## 2019-10-18 NOTE — TELEPHONE ENCOUNTER
Per Dr. Evelyne Mills, informed pt of result note, as noted above. Pt verbalized understanding with no further questions or concerns at this time.

## 2020-01-16 DIAGNOSIS — E11.65 UNCONTROLLED TYPE 2 DIABETES MELLITUS WITH HYPERGLYCEMIA (HCC): ICD-10-CM

## 2020-02-13 ENCOUNTER — PATIENT MESSAGE (OUTPATIENT)
Dept: ENDOCRINOLOGY | Age: 60
End: 2020-02-13

## 2020-02-13 DIAGNOSIS — Z79.4 UNCONTROLLED TYPE 2 DIABETES MELLITUS WITH HYPERGLYCEMIA, WITH LONG-TERM CURRENT USE OF INSULIN (HCC): ICD-10-CM

## 2020-02-13 DIAGNOSIS — E11.65 UNCONTROLLED TYPE 2 DIABETES MELLITUS WITH HYPERGLYCEMIA, WITH LONG-TERM CURRENT USE OF INSULIN (HCC): ICD-10-CM

## 2020-02-13 NOTE — TELEPHONE ENCOUNTER
From: Gunjan Moore  To: Carmen Alcocer MD  Sent: 2/13/2020 9:27 AM EST  Subject: Prescription Question    Hi, I am running low on the jardiance 25 mg that you prescribed for me. For some reason it is not on my medication refill list on my chart. Did you take me off the medication without my knowledge? Please let me know if Im still supposed to be on the jardiance or not and if I am could you put in the prescription through [facility]? Thank you and have a good day.   Reji Burnett   [phone]

## 2020-02-19 ENCOUNTER — HOSPITAL ENCOUNTER (OUTPATIENT)
Dept: LAB | Age: 60
Discharge: HOME | End: 2020-02-19

## 2020-02-19 ENCOUNTER — LAB ONLY (OUTPATIENT)
Dept: ENDOCRINOLOGY | Age: 60
End: 2020-02-19

## 2020-02-19 DIAGNOSIS — E11.65 TYPE 2 DIABETES MELLITUS WITH HYPERGLYCEMIA, WITH LONG-TERM CURRENT USE OF INSULIN (HCC): ICD-10-CM

## 2020-02-19 DIAGNOSIS — Z79.4 TYPE 2 DIABETES MELLITUS WITH HYPERGLYCEMIA, WITH LONG-TERM CURRENT USE OF INSULIN (HCC): Primary | ICD-10-CM

## 2020-02-19 DIAGNOSIS — Z79.4 TYPE 2 DIABETES MELLITUS WITH HYPERGLYCEMIA, WITH LONG-TERM CURRENT USE OF INSULIN (HCC): ICD-10-CM

## 2020-02-19 DIAGNOSIS — E78.2 MIXED HYPERLIPIDEMIA: ICD-10-CM

## 2020-02-19 DIAGNOSIS — I10 ESSENTIAL HYPERTENSION: ICD-10-CM

## 2020-02-19 DIAGNOSIS — E11.65 TYPE 2 DIABETES MELLITUS WITH HYPERGLYCEMIA, WITH LONG-TERM CURRENT USE OF INSULIN (HCC): Primary | ICD-10-CM

## 2020-02-19 LAB
CREAT UR-MCNC: 87.6 MG/DL
LDLC SERPL DIRECT ASSAY-MCNC: 84 MG/DL (ref 0–100)
MICROALBUMIN UR-MCNC: 0.57 MG/DL
MICROALBUMIN/CREAT UR-RTO: 7 MG/G (ref 0–30)

## 2020-02-20 LAB
ALBUMIN SERPL-MCNC: 4.1 G/DL (ref 3.5–5)
ALBUMIN/GLOB SERPL: 1.2 {RATIO} (ref 1.1–2.2)
ALP SERPL-CCNC: 112 U/L (ref 45–117)
ALT SERPL-CCNC: 21 U/L (ref 12–78)
ANION GAP SERPL CALC-SCNC: 4 MMOL/L (ref 5–15)
AST SERPL-CCNC: 14 U/L (ref 15–37)
BILIRUB SERPL-MCNC: 0.6 MG/DL (ref 0.2–1)
BUN SERPL-MCNC: 17 MG/DL (ref 6–20)
BUN/CREAT SERPL: 17 (ref 12–20)
CALCIUM SERPL-MCNC: 9.7 MG/DL (ref 8.5–10.1)
CHLORIDE SERPL-SCNC: 107 MMOL/L (ref 97–108)
CO2 SERPL-SCNC: 29 MMOL/L (ref 21–32)
CREAT SERPL-MCNC: 0.99 MG/DL (ref 0.55–1.02)
EST. AVERAGE GLUCOSE BLD GHB EST-MCNC: 157 MG/DL
GLOBULIN SER CALC-MCNC: 3.3 G/DL (ref 2–4)
GLUCOSE SERPL-MCNC: 117 MG/DL (ref 65–100)
HBA1C MFR BLD: 7.1 % (ref 4–5.6)
POTASSIUM SERPL-SCNC: 4.2 MMOL/L (ref 3.5–5.1)
PROT SERPL-MCNC: 7.4 G/DL (ref 6.4–8.2)
SODIUM SERPL-SCNC: 140 MMOL/L (ref 136–145)

## 2020-02-24 ENCOUNTER — HOSPITAL ENCOUNTER (OUTPATIENT)
Dept: GENERAL RADIOLOGY | Age: 60
Discharge: HOME | End: 2020-02-24
Attending: ORTHOPAEDIC SURGERY
Payer: OTHER GOVERNMENT

## 2020-02-24 DIAGNOSIS — M25.512 PAIN IN SHOULDER REGION, LEFT: ICD-10-CM

## 2020-02-24 DIAGNOSIS — M25.619 SHOULDER STIFFNESS: ICD-10-CM

## 2020-02-24 DIAGNOSIS — Z98.890 STATUS POST SHOULDER SURGERY: ICD-10-CM

## 2020-02-24 DIAGNOSIS — M75.02 ADHESIVE CAPSULITIS OF LEFT SHOULDER: ICD-10-CM

## 2020-02-24 PROCEDURE — 74011636320 HC RX REV CODE- 636/320: Performed by: RADIOLOGY

## 2020-02-24 PROCEDURE — 74011250636 HC RX REV CODE- 250/636: Performed by: RADIOLOGY

## 2020-02-24 PROCEDURE — 74011000250 HC RX REV CODE- 250: Performed by: RADIOLOGY

## 2020-02-24 PROCEDURE — 20610 DRAIN/INJ JOINT/BURSA W/O US: CPT

## 2020-02-24 RX ORDER — TRIAMCINOLONE ACETONIDE 40 MG/ML
40 INJECTION, SUSPENSION INTRA-ARTICULAR; INTRAMUSCULAR
Status: COMPLETED | OUTPATIENT
Start: 2020-02-24 | End: 2020-02-24

## 2020-02-24 RX ORDER — LIDOCAINE HYDROCHLORIDE 10 MG/ML
10 INJECTION, SOLUTION EPIDURAL; INFILTRATION; INTRACAUDAL; PERINEURAL
Status: COMPLETED | OUTPATIENT
Start: 2020-02-24 | End: 2020-02-24

## 2020-02-24 RX ORDER — BUPIVACAINE HYDROCHLORIDE 5 MG/ML
5 INJECTION, SOLUTION EPIDURAL; INTRACAUDAL
Status: COMPLETED | OUTPATIENT
Start: 2020-02-24 | End: 2020-02-24

## 2020-02-24 RX ADMIN — LIDOCAINE HYDROCHLORIDE 5 ML: 10 INJECTION, SOLUTION EPIDURAL; INFILTRATION; INTRACAUDAL; PERINEURAL at 14:45

## 2020-02-24 RX ADMIN — BUPIVACAINE HYDROCHLORIDE 25 MG: 5 INJECTION, SOLUTION EPIDURAL; INTRACAUDAL; PERINEURAL at 14:45

## 2020-02-24 RX ADMIN — IOHEXOL 20 ML: 180 INJECTION INTRAVENOUS at 14:45

## 2020-02-24 RX ADMIN — TRIAMCINOLONE ACETONIDE 40 MG: 40 INJECTION, SUSPENSION INTRA-ARTICULAR; INTRAMUSCULAR at 14:45

## 2020-02-27 ENCOUNTER — OFFICE VISIT (OUTPATIENT)
Dept: ENDOCRINOLOGY | Age: 60
End: 2020-02-27

## 2020-02-27 VITALS
SYSTOLIC BLOOD PRESSURE: 149 MMHG | HEART RATE: 88 BPM | DIASTOLIC BLOOD PRESSURE: 59 MMHG | WEIGHT: 220 LBS | RESPIRATION RATE: 16 BRPM | OXYGEN SATURATION: 98 % | HEIGHT: 70 IN | BODY MASS INDEX: 31.5 KG/M2 | TEMPERATURE: 98.5 F

## 2020-02-27 DIAGNOSIS — I10 ESSENTIAL HYPERTENSION: ICD-10-CM

## 2020-02-27 DIAGNOSIS — E11.65 TYPE 2 DIABETES MELLITUS WITH HYPERGLYCEMIA, WITH LONG-TERM CURRENT USE OF INSULIN (HCC): Primary | ICD-10-CM

## 2020-02-27 DIAGNOSIS — Z79.4 TYPE 2 DIABETES MELLITUS WITH HYPERGLYCEMIA, WITH LONG-TERM CURRENT USE OF INSULIN (HCC): Primary | ICD-10-CM

## 2020-02-27 DIAGNOSIS — E78.2 MIXED HYPERLIPIDEMIA: ICD-10-CM

## 2020-02-27 RX ORDER — LINACLOTIDE 145 UG/1
CAPSULE, GELATIN COATED ORAL
COMMUNITY
Start: 2020-02-20 | End: 2022-01-26

## 2020-02-27 NOTE — LETTER
2/27/20 Patient: Lata Masterson YOB: 1960 Date of Visit: 2/27/2020 Uzma Matthew NP 
[address] [address] VIA Facsimile: [phone] Dear Uzma Matthew NP, Thank you for referring Ms. Gege Rodarte to [address] [place] [address] for evaluation. My notes for this consultation are attached. If you have questions, please do not hesitate to call me. I look forward to following your patient along with you. Sincerely, Lisa Claudio MD

## 2020-02-27 NOTE — PROGRESS NOTES
Elvia Brewer is a 62 y.o. female here for   Chief Complaint   Patient presents with    Diabetes       1. Have you been to the ER, urgent care clinic since your last visit? Hospitalized since your last visit? -no    2. Have you seen or consulted any other health care providers outside of the [address] [place] [address] since your last visit? Include any pap smears or colon screening. -PCP

## 2020-02-27 NOTE — PROGRESS NOTES
Chief Complaint   Patient presents with    Diabetes       History of Present Illness: Bossman Rivera is a 62 y.o. female here for fu of  Type 2 Diabetes Mellitus. She had several doses of steroid injections as well as oral steroids for frozen shoulder, blood glucose was very high up to 300  Last dose was a week ago  Renal parameters have improved  Tolerating Trulicity  She is checking blood glucose twice daily        DM hx  Type 2 Diabetes was diagnosed in 2003 . Cardiovascular risk factors: family history, diabetes mellitus, obesity, hypertension, post-menopausal         Wt Readings from Last 3 Encounters:   02/27/20 220 lb (99.8 kg)   10/16/19 220 lb (99.8 kg)   06/12/19 225 lb 4.8 oz (102.2 kg)       BP Readings from Last 3 Encounters:   02/27/20 149/59   10/16/19 135/75   06/12/19 133/55       Past Medical History:   Diagnosis Date    Diabetes mellitus     GERD (gastroesophageal reflux disease)     HTN (hypertension)     Hyperlipidemia      Current Outpatient Medications   Medication Sig    LINZESS 145 mcg cap capsule     empagliflozin (JARDIANCE) 25 mg tablet Take 1 Tab by mouth every morning.  dulaglutide (TRULICITY) 1.5 QX/2.9 mL sub-q pen 0.5 mL by SubCUTAneous route every seven (7) days.  glipiZIDE SR (GLUCOTROL XL) 5 mg CR tablet Take 1-2 Tabs by mouth daily.  ibuprofen (MOTRIN) 800 mg tablet Take 800 mg by mouth every eight (8) hours as needed for Pain.  metFORMIN ER (GLUCOPHAGE XR) 500 mg tablet Take 2 Tabs by mouth two (2) times a day.  glucose blood VI test strips (FREESTYLE LITE STRIPS) strip Tests 3x daily Dx Code: E11.65    lancets (FREESTYLE LANCETS) 28 gauge misc Tests 3x daily Dx Code: E11.65    polyethylene glycol (MIRALAX) 17 gram packet Take 17 g by mouth as needed.  Blood-Glucose Meter (FREESTYLE LITE METER) monitoring kit Test 3 times daily Dx Code: E11.65    tolterodine ER (DETROL LA) 4 mg ER capsule Take 4 mg by mouth daily.     cholecalciferol, VITAMIN D3, (VITAMIN D3) 5,000 unit tab tablet Take 5,000 Units by mouth daily.  omeprazole (PRILOSEC) 20 mg capsule Take 20 mg by mouth daily.  lisinopril (PRINIVIL, ZESTRIL) 20 mg tablet Take 20 mg by mouth daily.  simvastatin (ZOCOR) 40 mg tablet Take 40 mg by mouth nightly.  gabapentin (NEURONTIN) 300 mg capsule Take 300 mg by mouth nightly. No current facility-administered medications for this visit. No Known Allergies      Review of Systems:  - Eyes: no blurry vision no double vision  - Cardiovascular: no chest pain ,no palpitations  - Respiratory: no cough no shortness of breath  - Gastrointestinal: no dysphagia no  abdominal pain  -   -     Physical Examination:   Blood pressure 149/59, pulse 88, temperature 98.5 °F (36.9 °C), temperature source Oral, resp. rate 16, height 5' 10\" (1.778 m), weight 220 lb (99.8 kg), SpO2 98 %. Estimated body mass index is 31.57 kg/m² as calculated from the following:    Height as of this encounter: 5' 10\" (1.778 m). -   Weight as of this encounter: 220 lb (99.8 kg).   - General: pleasant, no distress, good eye contact  - HEENT: no pallor, no periorbital edema, EOMI  - Neck: supple,  - Cardiovascular: regular, normal rate, normal S1 and S2  - Respiratory: clear to auscultation bilaterally  - Gastrointestinal: soft, nontender, nondistended,  BS +  - Musculoskeletal[de-identified] no edema,  - Neurological:alert and oriented  - Psychiatric: normal mood and affect  - Skin: color, texture, turgor normal.     Diabetic foot exam: June 2019   Left:     Vibratory sensation normal    Filament test normal sensation with micro filament   Pulse DP: 1+    Deformities: None  Right:    Vibratory sensation normal   Filament test normal sensation with micro filament   Pulse DP: 1+   Deformities: None      Data Reviewed:         Lab Results   Component Value Date/Time    Hemoglobin A1c 7.1 (H) 02/19/2020 08:16 AM    Hemoglobin A1c 7.1 (H) 06/04/2019 08:25 AM    Hemoglobin A1c 8.0 (H) 02/01/2019 08:20 AM    Glucose 117 (H) 02/19/2020 08:16 AM    Glucose  10/16/2019 08:39 AM    Microalbumin/Creat ratio (mg/g creat) 7 02/19/2020 08:16 AM    Microalbumin,urine random 0.57 02/19/2020 08:16 AM    LDL,Direct 84 02/19/2020 08:16 AM    LDL, calculated 57 06/04/2019 08:25 AM    Creatinine 0.99 02/19/2020 08:16 AM    Hemoglobin A1c, External 8.0 05/24/2018    Hemoglobin A1c, External 6.7 11/04/2016      Lab Results   Component Value Date/Time    Cholesterol, total 158 06/04/2019 08:25 AM    HDL Cholesterol 32 (L) 06/04/2019 08:25 AM    LDL,Direct 84 02/19/2020 08:16 AM    LDL, calculated 57 06/04/2019 08:25 AM    Triglyceride 345 (H) 06/04/2019 08:25 AM     Lab Results   Component Value Date/Time    ALT (SGPT) 21 02/19/2020 08:16 AM    AST (SGOT) 14 (L) 02/19/2020 08:16 AM    Alk. phosphatase 112 02/19/2020 08:16 AM    Bilirubin, total 0.6 02/19/2020 08:16 AM     Lab Results   Component Value Date/Time    GFR est AA >60 02/19/2020 08:16 AM    GFR est non-AA 57 (L) 02/19/2020 08:16 AM    Creatinine 0.99 02/19/2020 08:16 AM    BUN 17 02/19/2020 08:16 AM    Sodium 140 02/19/2020 08:16 AM    Potassium 4.2 02/19/2020 08:16 AM    Chloride 107 02/19/2020 08:16 AM    CO2 29 02/19/2020 08:16 AM      No results found for: TSH, TSHEXT       Assessment/Plan:     1. Type 2 Diabetes Mellitus with no known complications   Lab Results   Component Value Date/Time    Hemoglobin A1c 7.1 (H) 02/19/2020 08:16 AM    Hemoglobin A1c (POC) 7.3 10/16/2019 08:39 AM    Hemoglobin A1c, External 8.0 05/24/2018     Stable  Discussed about controlling the carb portions, if she starts adding more carb eventually will go back on insulin.   Sticking to the diet is the key, the only drawback to these very very low carbohydrate diets are patients usually have difficulty sticking to this diet long-term  Trulicity, metformin, Jardiance  Wean off glipizide      Bydureon - had velcarey, failed Bydureon in the past .Tried Tanzeum , on Trulicity     FLU annually ,Pneumovax ,aspirin daily,annual eye exam,microalbumin. 2.  HTN : Continue current therapy     3. Hyperlipidemia : Continue present therapy    4. Obesity Body mass index is 31.57 kg/m². Oct 2019 surgery for Frozen shoulder     Thank you for allowing me to participate in the care of this patient.     Cathie Ortiz MD      Patient verbalized understanding

## 2020-04-22 DIAGNOSIS — E11.65 UNCONTROLLED TYPE 2 DIABETES MELLITUS WITH HYPERGLYCEMIA (HCC): ICD-10-CM

## 2020-04-22 DIAGNOSIS — E11.65 TYPE 2 DIABETES MELLITUS WITH HYPERGLYCEMIA, WITH LONG-TERM CURRENT USE OF INSULIN (HCC): ICD-10-CM

## 2020-04-22 DIAGNOSIS — Z79.4 TYPE 2 DIABETES MELLITUS WITH HYPERGLYCEMIA, WITH LONG-TERM CURRENT USE OF INSULIN (HCC): ICD-10-CM

## 2020-04-23 RX ORDER — LANCETS 28 GAUGE
EACH MISCELLANEOUS
Qty: 300 LANCET | Refills: 3 | Status: SHIPPED | OUTPATIENT
Start: 2020-04-23 | End: 2022-01-09 | Stop reason: SDUPTHER

## 2020-04-23 RX ORDER — METFORMIN HYDROCHLORIDE 500 MG/1
1000 TABLET, EXTENDED RELEASE ORAL 2 TIMES DAILY
Qty: 360 TAB | Refills: 3 | Status: SHIPPED | OUTPATIENT
Start: 2020-04-23 | End: 2020-07-15 | Stop reason: ALTCHOICE

## 2020-07-15 ENCOUNTER — OFFICE VISIT (OUTPATIENT)
Dept: ENDOCRINOLOGY | Age: 60
End: 2020-07-15

## 2020-07-15 VITALS
OXYGEN SATURATION: 99 % | HEIGHT: 70 IN | SYSTOLIC BLOOD PRESSURE: 123 MMHG | BODY MASS INDEX: 31.21 KG/M2 | WEIGHT: 218 LBS | RESPIRATION RATE: 16 BRPM | TEMPERATURE: 96.9 F | HEART RATE: 82 BPM | DIASTOLIC BLOOD PRESSURE: 66 MMHG

## 2020-07-15 DIAGNOSIS — Z79.4 TYPE 2 DIABETES MELLITUS WITH HYPERGLYCEMIA, WITH LONG-TERM CURRENT USE OF INSULIN (HCC): Primary | ICD-10-CM

## 2020-07-15 DIAGNOSIS — E78.2 MIXED HYPERLIPIDEMIA: ICD-10-CM

## 2020-07-15 DIAGNOSIS — I10 ESSENTIAL HYPERTENSION: ICD-10-CM

## 2020-07-15 DIAGNOSIS — E11.65 TYPE 2 DIABETES MELLITUS WITH HYPERGLYCEMIA, WITH LONG-TERM CURRENT USE OF INSULIN (HCC): Primary | ICD-10-CM

## 2020-07-15 LAB — HBA1C MFR BLD HPLC: 7.9 %

## 2020-07-15 RX ORDER — EMPAGLIFLOZIN, METFORMIN HYDROCHLORIDE 12.5; 1 MG/1; MG/1
1 TABLET, EXTENDED RELEASE ORAL 2 TIMES DAILY
Qty: 180 EACH | Refills: 3 | Status: SHIPPED | OUTPATIENT
Start: 2020-07-15 | End: 2020-09-28 | Stop reason: ALTCHOICE

## 2020-07-15 RX ORDER — FLASH GLUCOSE SENSOR
KIT MISCELLANEOUS
Qty: 6 KIT | Refills: 3 | Status: SHIPPED | OUTPATIENT
Start: 2020-07-15 | End: 2022-01-26

## 2020-07-15 RX ORDER — FLASH GLUCOSE SCANNING READER
EACH MISCELLANEOUS
Qty: 1 EACH | Refills: 0 | Status: SHIPPED | OUTPATIENT
Start: 2020-07-15 | End: 2022-01-26

## 2020-07-15 NOTE — LETTER
7/15/20 Patient: Yara Knight YOB: 1960 Date of Visit: 7/15/2020 Kimber Mahmood NP 
[address] [address] VIA Facsimile: [phone] Dear Kimber Mahmood NP, Thank you for referring Ms. Gege Steele to [address] [place] [address] for evaluation. My notes for this consultation are attached. If you have questions, please do not hesitate to call me. I look forward to following your patient along with you. Sincerely, Becky Lake MD

## 2020-07-15 NOTE — PROGRESS NOTES
Chief Complaint   Patient presents with    Diabetes       History of Present Illness: Socrates Montemayor is a 56 y.o. female here for fu of  Type 2 Diabetes Mellitus. She had dental infection, was on several antibiotics, then UTI, on antibiotics  Decreased activity due to pandemic  Blood glucose have increased  She is compliant with her medications and has a gym at home  Renal parameters have improved  Tolerating Trulicity  She is checking blood glucose twice daily  BG is high       DM hx  Type 2 Diabetes was diagnosed in 2003 . Cardiovascular risk factors: family history, diabetes mellitus, obesity, hypertension, post-menopausal         Wt Readings from Last 3 Encounters:   07/15/20 218 lb (98.9 kg)   02/27/20 220 lb (99.8 kg)   10/16/19 220 lb (99.8 kg)       BP Readings from Last 3 Encounters:   07/15/20 123/66   02/27/20 149/59   10/16/19 135/75       Past Medical History:   Diagnosis Date    Diabetes mellitus     GERD (gastroesophageal reflux disease)     HTN (hypertension)     Hyperlipidemia      Current Outpatient Medications   Medication Sig    glucose blood VI test strips (FreeStyle Lite Strips) strip Tests 3x daily Dx Code: E11.65    lancets (FreeStyle Lancets) 28 gauge misc Tests 3x daily Dx Code: E11.65    metFORMIN ER (GLUCOPHAGE XR) 500 mg tablet Take 2 Tabs by mouth two (2) times a day.  LINZESS 145 mcg cap capsule     empagliflozin (JARDIANCE) 25 mg tablet Take 1 Tab by mouth every morning.  dulaglutide (TRULICITY) 1.5 UB/5.1 mL sub-q pen 0.5 mL by SubCUTAneous route every seven (7) days.  glipiZIDE SR (GLUCOTROL XL) 5 mg CR tablet Take 1-2 Tabs by mouth daily.  ibuprofen (MOTRIN) 800 mg tablet Take 800 mg by mouth every eight (8) hours as needed for Pain.  polyethylene glycol (MIRALAX) 17 gram packet Take 17 g by mouth as needed.     Blood-Glucose Meter (FREESTYLE LITE METER) monitoring kit Test 3 times daily Dx Code: E11.65    tolterodine ER (DETROL LA) 4 mg ER capsule Take 4 mg by mouth daily.  cholecalciferol, VITAMIN D3, (VITAMIN D3) 5,000 unit tab tablet Take 5,000 Units by mouth daily.  omeprazole (PRILOSEC) 20 mg capsule Take 20 mg by mouth daily.  lisinopril (PRINIVIL, ZESTRIL) 20 mg tablet Take 20 mg by mouth daily.  simvastatin (ZOCOR) 40 mg tablet Take 40 mg by mouth nightly.  gabapentin (NEURONTIN) 300 mg capsule Take 300 mg by mouth nightly. No current facility-administered medications for this visit. No Known Allergies      Review of Systems:  - Eyes: no blurry vision no double vision  - Cardiovascular: no chest pain ,no palpitations  - Respiratory: no cough no shortness of breath  - Gastrointestinal: no dysphagia no  abdominal pain  -   -     Physical Examination:   Blood pressure 123/66, pulse 82, temperature 96.9 °F (36.1 °C), temperature source Oral, resp. rate 16, height 5' 10\" (1.778 m), weight 218 lb (98.9 kg), SpO2 99 %. Estimated body mass index is 31.28 kg/m² as calculated from the following:    Height as of this encounter: 5' 10\" (1.778 m). -   Weight as of this encounter: 218 lb (98.9 kg).   - General: pleasant, no distress, good eye contact  - HEENT: no pallor, no periorbital edema, EOMI  - Neck: supple,  - Cardiovascular: regular, normal rate, normal S1 and S2  - Respiratory: clear to auscultation bilaterally  - Gastrointestinal: soft, nontender, nondistended,  BS +  - Musculoskeletal[de-identified] no edema,  - Neurological:alert and oriented  - Psychiatric: normal mood and affect  - Skin: color, texture, turgor normal.     Diabetic foot exam: June 2019   Left:     Vibratory sensation normal    Filament test normal sensation with micro filament   Pulse DP: 1+    Deformities: None  Right:    Vibratory sensation normal   Filament test normal sensation with micro filament   Pulse DP: 1+   Deformities: None      Data Reviewed:         Lab Results   Component Value Date/Time    Hemoglobin A1c 7.1 (H) 02/19/2020 08:16 AM    Hemoglobin A1c 7.1 (H) 06/04/2019 08:25 AM    Hemoglobin A1c 8.0 (H) 02/01/2019 08:20 AM    Glucose 117 (H) 02/19/2020 08:16 AM    Glucose  10/16/2019 08:39 AM    Microalbumin/Creat ratio (mg/g creat) 7 02/19/2020 08:16 AM    Microalbumin,urine random 0.57 02/19/2020 08:16 AM    LDL,Direct 84 02/19/2020 08:16 AM    LDL, calculated 57 06/04/2019 08:25 AM    Creatinine 0.99 02/19/2020 08:16 AM    Hemoglobin A1c, External 8.0 05/24/2018    Hemoglobin A1c, External 6.7 11/04/2016      Lab Results   Component Value Date/Time    Cholesterol, total 158 06/04/2019 08:25 AM    HDL Cholesterol 32 (L) 06/04/2019 08:25 AM    LDL,Direct 84 02/19/2020 08:16 AM    LDL, calculated 57 06/04/2019 08:25 AM    Triglyceride 345 (H) 06/04/2019 08:25 AM     Lab Results   Component Value Date/Time    ALT (SGPT) 21 02/19/2020 08:16 AM    Alk. phosphatase 112 02/19/2020 08:16 AM    Bilirubin, total 0.6 02/19/2020 08:16 AM     Lab Results   Component Value Date/Time    GFR est AA >60 02/19/2020 08:16 AM    GFR est non-AA 57 (L) 02/19/2020 08:16 AM    Creatinine 0.99 02/19/2020 08:16 AM    BUN 17 02/19/2020 08:16 AM    Sodium 140 02/19/2020 08:16 AM    Potassium 4.2 02/19/2020 08:16 AM    Chloride 107 02/19/2020 08:16 AM    CO2 29 02/19/2020 08:16 AM      No results found for: TSH, TSHEXT       Assessment/Plan:     1. Type 2 Diabetes Mellitus with no known complications   Lab Results   Component Value Date/Time    Hemoglobin A1c 7.1 (H) 02/19/2020 08:16 AM    Hemoglobin A1c (POC) 7.9 07/15/2020 09:07 AM    Hemoglobin A1c, External 8.0 05/24/2018     Uncontrolled  Agreed to resume exercise  Discussed about controlling the carb portions, if she starts adding more carb eventually will go back on insulin.   Sticking to the diet is the key, the only drawback to these very very low carbohydrate diets are patients usually have difficulty sticking to this diet long-term  Trulicity, metformin, Jardiance  Wean off glipizide      Bydureon - had velcarey, failed Bydureon in the past .Tried Tanzeum , on Trulicity     FLU annually ,Pneumovax ,aspirin daily,annual eye exam,microalbumin. 2.  HTN : Continue current therapy     3. Hyperlipidemia : Continue present therapy    4. Obesity Body mass index is 31.28 kg/m². Oct 2019 surgery for Frozen shoulder     Thank you for allowing me to participate in the care of this patient.     Naheed Jorge MD      Patient verbalized understanding

## 2020-07-15 NOTE — PROGRESS NOTES
Jersey Mays is a 56 y.o. female here for   Chief Complaint   Patient presents with    Diabetes       1. Have you been to the ER, urgent care clinic since your last visit? Hospitalized since your last visit? -no    2. Have you seen or consulted any other health care providers outside of the [address] [place] [address] since your last visit?   Include any pap smears or colon screening.-[facility]

## 2020-09-22 DIAGNOSIS — I10 ESSENTIAL HYPERTENSION: ICD-10-CM

## 2020-09-22 DIAGNOSIS — E11.65 TYPE 2 DIABETES MELLITUS WITH HYPERGLYCEMIA, WITH LONG-TERM CURRENT USE OF INSULIN (HCC): ICD-10-CM

## 2020-09-22 DIAGNOSIS — E78.2 MIXED HYPERLIPIDEMIA: ICD-10-CM

## 2020-09-22 DIAGNOSIS — Z79.4 TYPE 2 DIABETES MELLITUS WITH HYPERGLYCEMIA, WITH LONG-TERM CURRENT USE OF INSULIN (HCC): ICD-10-CM

## 2020-09-23 RX ORDER — GLIPIZIDE 5 MG/1
5-10 TABLET, FILM COATED, EXTENDED RELEASE ORAL DAILY
Qty: 180 TAB | Refills: 3 | Status: SHIPPED | OUTPATIENT
Start: 2020-09-23 | End: 2021-09-22 | Stop reason: SDUPTHER

## 2020-09-28 DIAGNOSIS — E11.65 TYPE 2 DIABETES MELLITUS WITH HYPERGLYCEMIA, WITH LONG-TERM CURRENT USE OF INSULIN (HCC): Primary | ICD-10-CM

## 2020-09-28 DIAGNOSIS — Z79.4 TYPE 2 DIABETES MELLITUS WITH HYPERGLYCEMIA, WITH LONG-TERM CURRENT USE OF INSULIN (HCC): Primary | ICD-10-CM

## 2020-09-28 RX ORDER — PIOGLITAZONEHYDROCHLORIDE 15 MG/1
15 TABLET ORAL DAILY
Qty: 90 TAB | Refills: 2 | Status: SHIPPED
Start: 2020-09-28 | End: 2021-05-20

## 2020-09-28 RX ORDER — METFORMIN HYDROCHLORIDE 750 MG/1
750 TABLET, EXTENDED RELEASE ORAL DAILY
Qty: 180 TAB | Refills: 1 | Status: SHIPPED | OUTPATIENT
Start: 2020-09-28 | End: 2020-11-12 | Stop reason: SDUPTHER

## 2020-09-30 ENCOUNTER — TELEPHONE (OUTPATIENT)
Dept: ENDOCRINOLOGY | Age: 60
End: 2020-09-30

## 2020-09-30 NOTE — TELEPHONE ENCOUNTER
----- Message from Adina Maharaj sent at 2020  9:06 AM EDT -----  Regarding: MD Mcneil/ telephone  Patient's first and last name: Jumana Clark   : 1960    Caller's first and last name: pt    Reason for call: Medication clarification    Callback required yes/no and why: yes     Best contact number(s): [phone]    Details to clarify the request: Pt would like a call to discuss medication list and changes to medication dosages.

## 2020-09-30 NOTE — TELEPHONE ENCOUNTER
Returned pt's call. Pt stated she wanted to speak to Dr. Tyra Puckett directly about issues she's mercedez having with her medications. Informed her that Dr. Tyra Puckett is seeing pt's and that it may be a wile. Pt stated that she'll wait.

## 2020-10-01 DIAGNOSIS — I10 ESSENTIAL HYPERTENSION: ICD-10-CM

## 2020-10-01 DIAGNOSIS — E78.2 MIXED HYPERLIPIDEMIA: ICD-10-CM

## 2020-10-01 DIAGNOSIS — Z79.4 TYPE 2 DIABETES MELLITUS WITH HYPERGLYCEMIA, WITH LONG-TERM CURRENT USE OF INSULIN (HCC): ICD-10-CM

## 2020-10-01 DIAGNOSIS — E11.65 TYPE 2 DIABETES MELLITUS WITH HYPERGLYCEMIA, WITH LONG-TERM CURRENT USE OF INSULIN (HCC): ICD-10-CM

## 2020-10-16 LAB
BUN SERPL-MCNC: 23 MG/DL (ref 8–27)
BUN/CREAT SERPL: 19 (ref 12–28)
CALCIUM SERPL-MCNC: 9.8 MG/DL (ref 8.7–10.3)
CHLORIDE SERPL-SCNC: 105 MMOL/L (ref 96–106)
CO2 SERPL-SCNC: 25 MMOL/L (ref 20–29)
CREAT SERPL-MCNC: 1.22 MG/DL (ref 0.57–1)
EST. AVERAGE GLUCOSE BLD GHB EST-MCNC: 174 MG/DL
GLUCOSE SERPL-MCNC: 116 MG/DL (ref 65–99)
HBA1C MFR BLD: 7.7 % (ref 4.8–5.6)
INTERPRETATION: NORMAL
LDLC SERPL DIRECT ASSAY-MCNC: 63 MG/DL (ref 0–99)
Lab: NORMAL
POTASSIUM SERPL-SCNC: 4.7 MMOL/L (ref 3.5–5.2)
SODIUM SERPL-SCNC: 143 MMOL/L (ref 134–144)

## 2020-10-19 ENCOUNTER — OFFICE VISIT (OUTPATIENT)
Dept: ENDOCRINOLOGY | Age: 60
End: 2020-10-19
Payer: OTHER GOVERNMENT

## 2020-10-19 VITALS
RESPIRATION RATE: 16 BRPM | HEIGHT: 70 IN | BODY MASS INDEX: 31.35 KG/M2 | SYSTOLIC BLOOD PRESSURE: 140 MMHG | DIASTOLIC BLOOD PRESSURE: 64 MMHG | HEART RATE: 80 BPM | WEIGHT: 219 LBS | OXYGEN SATURATION: 100 % | TEMPERATURE: 97.2 F

## 2020-10-19 DIAGNOSIS — Z79.4 TYPE 2 DIABETES MELLITUS WITH HYPERGLYCEMIA, WITH LONG-TERM CURRENT USE OF INSULIN (HCC): Primary | ICD-10-CM

## 2020-10-19 DIAGNOSIS — I10 ESSENTIAL HYPERTENSION: ICD-10-CM

## 2020-10-19 DIAGNOSIS — E78.2 MIXED HYPERLIPIDEMIA: ICD-10-CM

## 2020-10-19 DIAGNOSIS — E11.65 TYPE 2 DIABETES MELLITUS WITH HYPERGLYCEMIA, WITH LONG-TERM CURRENT USE OF INSULIN (HCC): Primary | ICD-10-CM

## 2020-10-19 PROCEDURE — 3051F HG A1C>EQUAL 7.0%<8.0%: CPT | Performed by: INTERNAL MEDICINE

## 2020-10-19 PROCEDURE — 99214 OFFICE O/P EST MOD 30 MIN: CPT | Performed by: INTERNAL MEDICINE

## 2020-10-19 RX ORDER — PLECANATIDE 3 MG/1
1 TABLET ORAL DAILY
COMMUNITY
End: 2021-09-22

## 2020-10-19 RX ORDER — EMPAGLIFLOZIN, METFORMIN HYDROCHLORIDE 12.5; 1 MG/1; MG/1
TABLET, EXTENDED RELEASE ORAL 2 TIMES DAILY
COMMUNITY
End: 2020-11-12

## 2020-10-19 RX ORDER — SULFAMETHOXAZOLE AND TRIMETHOPRIM 400; 80 MG/1; MG/1
TABLET ORAL
COMMUNITY
Start: 2020-10-13 | End: 2021-09-22 | Stop reason: ALTCHOICE

## 2020-10-19 NOTE — LETTER
10/20/20 Patient: Siva Obrien YOB: 1960 Date of Visit: 10/19/2020 Ana Maria Hassan NP 
[address] [address] VIA Facsimile: [phone] Dear Ana Maria Hassan NP, Thank you for referring Ms. Gege Alexander to [address] [place] [address] for evaluation. My notes for this consultation are attached. If you have questions, please do not hesitate to call me. I look forward to following your patient along with you. Sincerely, Luca Hobbs MD

## 2020-10-19 NOTE — PROGRESS NOTES
Glenroy Maharaj is a 56 y.o. female here for   Chief Complaint   Patient presents with    Diabetes       1. Have you been to the ER, urgent care clinic since your last visit? Hospitalized since your last visit? -no    2. Have you seen or consulted any other health care providers outside of the [address] [place] [address] since your last visit?   Include any pap smears or colon screening.-urology

## 2020-10-19 NOTE — PROGRESS NOTES
Chief Complaint   Patient presents with    Diabetes       History of Present Illness: Ubaldo Hamlin is a 56 y.o. female here for fu of  Type 2 Diabetes Mellitus. Continues to have UTI, seen urologist, on Bactrim now which she will take for 3 months  On SGLT2 inhibitors  She has been on it for a long time, UTI recently  She is compliant with her medications and has a gym at home  [employer]  She is checking blood glucose twice daily      DM hx  Type 2 Diabetes was diagnosed in 2003 . Cardiovascular risk factors: family history, diabetes mellitus, obesity, hypertension, post-menopausal         Wt Readings from Last 3 Encounters:   10/19/20 219 lb (99.3 kg)   07/15/20 218 lb (98.9 kg)   02/27/20 220 lb (99.8 kg)       BP Readings from Last 3 Encounters:   10/19/20 (!) 140/64   07/15/20 123/66   02/27/20 149/59       Past Medical History:   Diagnosis Date    Diabetes mellitus     GERD (gastroesophageal reflux disease)     HTN (hypertension)     Hyperlipidemia      Current Outpatient Medications   Medication Sig    trimethoprim-sulfamethoxazole (BACTRIM, SEPTRA)  mg per tablet take 1 tablet by mouth once daily    plecanatide (Trulance) 3 mg tab Take 1 Tab by mouth daily. Trulance    empagliflozin-metformin (Synjardy XR) 12.5-1,000 mg TBph Take  by mouth two (2) times a day. synjardy xr    glipiZIDE SR (GLUCOTROL XL) 5 mg CR tablet Take 1-2 Tabs by mouth daily.  glucose blood VI test strips (FreeStyle Lite Strips) strip Tests 3x daily Dx Code: E11.65    lancets (FreeStyle Lancets) 28 gauge misc Tests 3x daily Dx Code: E11.65    dulaglutide (TRULICITY) 1.5 GZ/8.1 mL sub-q pen 0.5 mL by SubCUTAneous route every seven (7) days.  Blood-Glucose Meter (FREESTYLE LITE METER) monitoring kit Test 3 times daily Dx Code: E11.65    tolterodine ER (DETROL LA) 4 mg ER capsule Take 4 mg by mouth daily.     cholecalciferol, VITAMIN D3, (VITAMIN D3) 5,000 unit tab tablet Take 5,000 Units by mouth daily.  omeprazole (PRILOSEC) 20 mg capsule Take 20 mg by mouth daily.  lisinopril (PRINIVIL, ZESTRIL) 20 mg tablet Take 20 mg by mouth daily.  simvastatin (ZOCOR) 40 mg tablet Take 40 mg by mouth nightly.  metFORMIN ER (GLUCOPHAGE XR) 750 mg tablet Take 1 Tab by mouth daily. Discontinue Synjardy    pioglitazone (ACTOS) 15 mg tablet Take 1 Tab by mouth daily.  flash glucose sensor ([employer] SkyStyle Ethan 14 Day Sensor) kit Use as directed every 14 days Dx Code: E11.65    flash glucose scanning reader (FreeStyle Ethan 14 Day [place]) misc Use as directed daily Dx Code: E11.65    LINZESS 145 mcg cap capsule     gabapentin (NEURONTIN) 300 mg capsule Take 300 mg by mouth nightly.  ibuprofen (MOTRIN) 800 mg tablet Take 800 mg by mouth every eight (8) hours as needed for Pain.  polyethylene glycol (MIRALAX) 17 gram packet Take 17 g by mouth as needed. No current facility-administered medications for this visit. No Known Allergies      Review of Systems:  - Eyes: no blurry vision no double vision  - Cardiovascular: no chest pain ,no palpitations  - Respiratory: no cough no shortness of breath  - Gastrointestinal: no dysphagia no  abdominal pain  -   -     Physical Examination:   Blood pressure (!) 140/64, pulse 80, temperature 97.2 °F (36.2 °C), temperature source Oral, resp. rate 16, height 5' 10\" (1.778 m), weight 219 lb (99.3 kg), SpO2 100 %. Estimated body mass index is 31.42 kg/m² as calculated from the following:    Height as of this encounter: 5' 10\" (1.778 m). -   Weight as of this encounter: 219 lb (99.3 kg).   - General: pleasant, no distress, good eye contact  - HEENT: no pallor, no periorbital edema, EOMI  - Neck: supple,  - Cardiovascular: regular, normal rate, normal S1 and S2  - Respiratory: clear to auscultation bilaterally  - Gastrointestinal: soft, nontender, nondistended,  BS +  - Musculoskeletal[de-identified] no edema,  - Neurological:alert and oriented  - Psychiatric: normal mood and affect  - Skin: color, texture, turgor normal.     Diabetic foot exam: June 2019   Left:     Vibratory sensation normal    Filament test normal sensation with micro filament   Pulse DP: 1+    Deformities: None  Right:    Vibratory sensation normal   Filament test normal sensation with micro filament   Pulse DP: 1+   Deformities: None      Data Reviewed:         Lab Results   Component Value Date/Time    Hemoglobin A1c 7.7 (H) 10/15/2020 08:37 AM    Hemoglobin A1c 7.1 (H) 02/19/2020 08:16 AM    Hemoglobin A1c 7.1 (H) 06/04/2019 08:25 AM    Glucose 116 (H) 10/15/2020 08:37 AM    Glucose  10/16/2019 08:39 AM    Microalbumin/Creat ratio (mg/g creat) 7 02/19/2020 08:16 AM    Microalbumin,urine random 0.57 02/19/2020 08:16 AM    LDL,Direct 63 10/15/2020 08:37 AM    LDL, calculated 57 06/04/2019 08:25 AM    Creatinine 1.22 (H) 10/15/2020 08:37 AM    Hemoglobin A1c, External 8.0 05/24/2018    Hemoglobin A1c, External 6.7 11/04/2016      Lab Results   Component Value Date/Time    Cholesterol, total 158 06/04/2019 08:25 AM    HDL Cholesterol 32 (L) 06/04/2019 08:25 AM    LDL,Direct 63 10/15/2020 08:37 AM    LDL, calculated 57 06/04/2019 08:25 AM    Triglyceride 345 (H) 06/04/2019 08:25 AM     Lab Results   Component Value Date/Time    ALT (SGPT) 21 02/19/2020 08:16 AM    Alk. phosphatase 112 02/19/2020 08:16 AM    Bilirubin, total 0.6 02/19/2020 08:16 AM     Lab Results   Component Value Date/Time    GFR est AA 55 (L) 10/15/2020 08:37 AM    GFR est non-AA 48 (L) 10/15/2020 08:37 AM    Creatinine 1.22 (H) 10/15/2020 08:37 AM    BUN 23 10/15/2020 08:37 AM    Sodium 143 10/15/2020 08:37 AM    Potassium 4.7 10/15/2020 08:37 AM    Chloride 105 10/15/2020 08:37 AM    CO2 25 10/15/2020 08:37 AM      No results found for: TSH, TSHEXT       Assessment/Plan:     1.  Type 2 Diabetes Mellitus with no known complications   Lab Results   Component Value Date/Time    Hemoglobin A1c 7.7 (H) 10/15/2020 08:37 AM    Hemoglobin A1c (POC) 7.9 07/15/2020 09:07 AM    Hemoglobin A1c, External 8.0 05/24/2018     Increase activity  Uncontrolled  Could come off insulin due to low carb diet, sticking to the diet is the key, the only drawback to these very very low carbohydrate diets are patients usually have difficulty sticking to this diet long-term  Trulicity, metformin, Jardiance  Wean off glipizide  Discussed about Actos, not interested now      Bydureon - had velts, failed Bydureon in the past .Tried Tanzeum , on Trulicity     FLU annually ,Pneumovax ,aspirin daily,annual eye exam,microalbumin. 2.  HTN : Continue current therapy     3. Hyperlipidemia : Continue present therapy    4. Obesity Body mass index is 31.42 kg/m². Oct 2019 surgery for Frozen shoulder     Thank you for allowing me to participate in the care of this patient.     Bebeto Santillan MD      Patient verbalized understanding

## 2020-11-12 ENCOUNTER — PATIENT MESSAGE (OUTPATIENT)
Dept: ENDOCRINOLOGY | Age: 60
End: 2020-11-12

## 2020-11-12 DIAGNOSIS — E11.65 TYPE 2 DIABETES MELLITUS WITH HYPERGLYCEMIA, WITH LONG-TERM CURRENT USE OF INSULIN (HCC): ICD-10-CM

## 2020-11-12 DIAGNOSIS — Z79.4 TYPE 2 DIABETES MELLITUS WITH HYPERGLYCEMIA, WITH LONG-TERM CURRENT USE OF INSULIN (HCC): ICD-10-CM

## 2020-11-12 RX ORDER — METFORMIN HYDROCHLORIDE 750 MG/1
750 TABLET, EXTENDED RELEASE ORAL 2 TIMES DAILY
Qty: 180 TAB | Refills: 3 | Status: SHIPPED
Start: 2020-11-12 | End: 2021-05-20 | Stop reason: ALTCHOICE

## 2020-11-12 RX ORDER — METFORMIN HYDROCHLORIDE 750 MG/1
750 TABLET, EXTENDED RELEASE ORAL 2 TIMES DAILY
Qty: 180 TAB | Refills: 3 | Status: CANCELLED | OUTPATIENT
Start: 2020-11-12

## 2020-11-12 NOTE — TELEPHONE ENCOUNTER
From: Mitali Morocho  To: Ami Aguilar MD  Sent: 11/12/2020 9:23 AM EST  Subject: Non-Urgent Medical Question    Good morning Dr. Andrew Zheng, I keep forgetting to let you know what was on my arm the dermatologist diagnosed it as granuloma annular . The real reason why Im contacting you is to let you know I have another uti so I decided to start the metformin 750mg and actos 15 mg you prescribed and stopped taking the synjardy for now just to see if it may be causing these frequent utis. My prescription for the metformin and actos says once a day for both, is that correct? Also my urologists name is Daniel Casiano with [facility]. Take care and please let me know about the new meds question.   Kendal Crabtree

## 2021-01-08 DIAGNOSIS — E11.65 UNCONTROLLED TYPE 2 DIABETES MELLITUS WITH HYPERGLYCEMIA (HCC): ICD-10-CM

## 2021-01-11 RX ORDER — DULAGLUTIDE 1.5 MG/.5ML
1.5 INJECTION, SOLUTION SUBCUTANEOUS
Qty: 12 SYRINGE | Refills: 3 | Status: SHIPPED
Start: 2021-01-11 | End: 2021-05-20 | Stop reason: DRUGHIGH

## 2021-01-20 ENCOUNTER — OFFICE VISIT (OUTPATIENT)
Dept: ENDOCRINOLOGY | Age: 61
End: 2021-01-20
Payer: OTHER GOVERNMENT

## 2021-01-20 VITALS
OXYGEN SATURATION: 97 % | HEART RATE: 83 BPM | WEIGHT: 230.7 LBS | DIASTOLIC BLOOD PRESSURE: 63 MMHG | TEMPERATURE: 97.5 F | HEIGHT: 70 IN | SYSTOLIC BLOOD PRESSURE: 129 MMHG | BODY MASS INDEX: 33.03 KG/M2 | RESPIRATION RATE: 16 BRPM

## 2021-01-20 DIAGNOSIS — Z79.4 TYPE 2 DIABETES MELLITUS WITH HYPERGLYCEMIA, WITH LONG-TERM CURRENT USE OF INSULIN (HCC): Primary | ICD-10-CM

## 2021-01-20 DIAGNOSIS — E11.65 TYPE 2 DIABETES MELLITUS WITH HYPERGLYCEMIA, WITH LONG-TERM CURRENT USE OF INSULIN (HCC): Primary | ICD-10-CM

## 2021-01-20 DIAGNOSIS — I10 ESSENTIAL HYPERTENSION: ICD-10-CM

## 2021-01-20 DIAGNOSIS — E78.2 MIXED HYPERLIPIDEMIA: ICD-10-CM

## 2021-01-20 PROCEDURE — 99214 OFFICE O/P EST MOD 30 MIN: CPT | Performed by: INTERNAL MEDICINE

## 2021-01-20 PROCEDURE — 3051F HG A1C>EQUAL 7.0%<8.0%: CPT | Performed by: INTERNAL MEDICINE

## 2021-01-20 NOTE — LETTER
1/20/2021 Patient: Hattie Mao YOB: 1960 Date of Visit: 1/20/2021 Donny Ortiz NP 
[address] [address] Via Fax: [phone] Dear Donny Ortiz NP, Thank you for referring Ms. Gege Floyd to [address] [place] [address] for evaluation. My notes for this consultation are attached. If you have questions, please do not hesitate to call me. I look forward to following your patient along with you. Sincerely, Yo Meyers MD

## 2021-01-20 NOTE — PROGRESS NOTES
Chief Complaint   Patient presents with    Diabetes       History of Present Illness: Betina Rock is a 56 y.o. female here for fu of  Type 2 Diabetes Mellitus. Chronic UTI, followed by urologist, on antibiotics, vaginal estrogen cream  Off SGLT2 inhibitors  On Actos  Reports weight gain  She is compliant with her medications and has a gym at home  Tolerating Trulicity        DM hx  Type 2 Diabetes was diagnosed in 2003 . Cardiovascular risk factors: family history, diabetes mellitus, obesity, hypertension, post-menopausal         Wt Readings from Last 3 Encounters:   01/20/21 230 lb 11.2 oz (104.6 kg)   10/19/20 219 lb (99.3 kg)   07/15/20 218 lb (98.9 kg)       BP Readings from Last 3 Encounters:   01/20/21 129/63   10/19/20 (!) 140/64   07/15/20 123/66       Past Medical History:   Diagnosis Date    Diabetes mellitus     GERD (gastroesophageal reflux disease)     HTN (hypertension)     Hyperlipidemia      Current Outpatient Medications   Medication Sig    dulaglutide (Trulicity) 1.5 MG/3.9 mL sub-q pen 0.5 mL by SubCUTAneous route every seven (7) days.  metFORMIN ER (GLUCOPHAGE XR) 750 mg tablet Take 1 Tab by mouth two (2) times a day. Discontinue Synjardy    trimethoprim-sulfamethoxazole (BACTRIM, SEPTRA)  mg per tablet take 1 tablet by mouth once daily    plecanatide (Trulance) 3 mg tab Take 1 Tab by mouth daily. Trulance    glipiZIDE SR (GLUCOTROL XL) 5 mg CR tablet Take 1-2 Tabs by mouth daily.     flash glucose sensor (FreeStyle Ethan 14 Day Sensor) kit Use as directed every 14 days Dx Code: E11.65    flash glucose scanning reader (FreeStyle Ethan 14 Day [place]) misc Use as directed daily Dx Code: E11.65    glucose blood VI test strips (FreeStyle Lite Strips) strip Tests 3x daily Dx Code: E11.65    lancets (FreeStyle Lancets) 28 gauge misc Tests 3x daily Dx Code: E11.65    Blood-Glucose Meter (FREESTYLE LITE METER) monitoring kit Test 3 times daily Dx Code: Q13.09    tolterodine ER (DETROL LA) 4 mg ER capsule Take 4 mg by mouth daily.  cholecalciferol, VITAMIN D3, (VITAMIN D3) 5,000 unit tab tablet Take 5,000 Units by mouth daily.  omeprazole (PRILOSEC) 20 mg capsule Take 20 mg by mouth daily.  lisinopril (PRINIVIL, ZESTRIL) 20 mg tablet Take 20 mg by mouth daily.  simvastatin (ZOCOR) 40 mg tablet Take 40 mg by mouth nightly.  pioglitazone (ACTOS) 15 mg tablet Take 1 Tab by mouth daily.  LINZESS 145 mcg cap capsule      No current facility-administered medications for this visit. No Known Allergies      Review of Systems:  - Per HPI  -   -     Physical Examination:   Blood pressure 129/63, pulse 83, temperature 97.5 °F (36.4 °C), temperature source Oral, resp. rate 16, height 5' 10\" (1.778 m), weight 230 lb 11.2 oz (104.6 kg), SpO2 97 %. Estimated body mass index is 33.1 kg/m² as calculated from the following:    Height as of this encounter: 5' 10\" (1.778 m). -   Weight as of this encounter: 230 lb 11.2 oz (104.6 kg).   - General: pleasant, no distress, good eye contact  - HEENT: no pallor, no periorbital edema, EOMI  - Neck: supple,  - Cardiovascular: regular, normal rate, normal S1 and S2  - Respiratory: clear to auscultation bilaterally  -   - Musculoskeletal[de-identified] no edema,  - Neurological:alert and oriented  - Psychiatric: normal mood and affect  - Skin: color, texture, turgor normal.           Data Reviewed:         Lab Results   Component Value Date/Time    Hemoglobin A1c 7.0 (H) 01/11/2021 09:05 AM    Hemoglobin A1c 7.7 (H) 10/15/2020 08:37 AM    Hemoglobin A1c 7.1 (H) 02/19/2020 08:16 AM    Glucose 121 (H) 01/11/2021 09:05 AM    Glucose  10/16/2019 08:39 AM    Microalbumin/Creat ratio (mg/g creat) 7 02/19/2020 08:16 AM    Microalb/Creat ratio (ug/mg creat.) <4 01/11/2021 09:05 AM    Microalbumin,urine random 0.57 02/19/2020 08:16 AM    LDL,Direct 63 10/15/2020 08:37 AM    LDL, calculated 57 06/04/2019 08:25 AM    Creatinine 1.00 01/11/2021 09:05 AM    Hemoglobin A1c, External 8.0 05/24/2018    Hemoglobin A1c, External 6.7 11/04/2016      Lab Results   Component Value Date/Time    Cholesterol, total 158 06/04/2019 08:25 AM    HDL Cholesterol 32 (L) 06/04/2019 08:25 AM    LDL,Direct 63 10/15/2020 08:37 AM    LDL, calculated 57 06/04/2019 08:25 AM    Triglyceride 345 (H) 06/04/2019 08:25 AM     Lab Results   Component Value Date/Time    ALT (SGPT) 24 01/11/2021 09:05 AM    Alk. phosphatase 116 01/11/2021 09:05 AM    Bilirubin, total 0.5 01/11/2021 09:05 AM     Lab Results   Component Value Date/Time    GFR est AA 70 01/11/2021 09:05 AM    GFR est non-AA 61 01/11/2021 09:05 AM    Creatinine 1.00 01/11/2021 09:05 AM    BUN 20 01/11/2021 09:05 AM    Sodium 145 (H) 01/11/2021 09:05 AM    Potassium 4.2 01/11/2021 09:05 AM    Chloride 106 01/11/2021 09:05 AM    CO2 24 01/11/2021 09:05 AM      No results found for: TSH, TSHEXT       Assessment/Plan:     1. Type 2 Diabetes Mellitus with no known complications   Lab Results   Component Value Date/Time    Hemoglobin A1c 7.0 (H) 01/11/2021 09:05 AM    Hemoglobin A1c (POC) 7.9 07/15/2020 09:07 AM    Hemoglobin A1c, External 8.0 05/24/2018     Controlled  Off insulin  Trulicity, metformin, Actos  Wean off glipizide  Gaining weight since being off Jardiance,  wishes to retry [facility]air in 2 months to see if UTI recurs  If on Jardiance could come off Actos      Bydureon - had velts, failed Bydureon in the past .Tried Tanzeum , on Trulicity     FLU annually ,Pneumovax ,aspirin daily,annual eye exam,microalbumin. 2.  HTN : Continue current therapy     3. Hyperlipidemia : Continue present therapy    4. Obesity Body mass index is 33.1 kg/m². Oct 2019 surgery for Frozen shoulder     Thank you for allowing me to participate in the care of this patient.     Mily Peters MD      Patient verbalized understanding

## 2021-05-20 ENCOUNTER — OFFICE VISIT (OUTPATIENT)
Dept: ENDOCRINOLOGY | Age: 61
End: 2021-05-20
Payer: OTHER GOVERNMENT

## 2021-05-20 VITALS
HEART RATE: 86 BPM | TEMPERATURE: 98.2 F | HEIGHT: 70 IN | BODY MASS INDEX: 32.5 KG/M2 | OXYGEN SATURATION: 99 % | SYSTOLIC BLOOD PRESSURE: 168 MMHG | DIASTOLIC BLOOD PRESSURE: 72 MMHG | WEIGHT: 227 LBS

## 2021-05-20 DIAGNOSIS — E11.65 TYPE 2 DIABETES MELLITUS WITH HYPERGLYCEMIA, UNSPECIFIED WHETHER LONG TERM INSULIN USE (HCC): Primary | ICD-10-CM

## 2021-05-20 DIAGNOSIS — I10 ESSENTIAL HYPERTENSION: ICD-10-CM

## 2021-05-20 DIAGNOSIS — E78.2 MIXED HYPERLIPIDEMIA: ICD-10-CM

## 2021-05-20 PROCEDURE — 3051F HG A1C>EQUAL 7.0%<8.0%: CPT | Performed by: INTERNAL MEDICINE

## 2021-05-20 PROCEDURE — 99214 OFFICE O/P EST MOD 30 MIN: CPT | Performed by: INTERNAL MEDICINE

## 2021-05-20 RX ORDER — EMPAGLIFLOZIN, METFORMIN HYDROCHLORIDE 12.5; 1 MG/1; MG/1
TABLET, EXTENDED RELEASE ORAL
Qty: 180 EACH | Refills: 3 | Status: SHIPPED | OUTPATIENT
Start: 2021-05-20 | End: 2022-05-02

## 2021-05-20 RX ORDER — DULAGLUTIDE 3 MG/.5ML
3 INJECTION, SOLUTION SUBCUTANEOUS
Qty: 12 EACH | Refills: 3 | Status: SHIPPED | OUTPATIENT
Start: 2021-05-20 | End: 2022-03-21

## 2021-05-20 NOTE — PROGRESS NOTES
Chief Complaint   Patient presents with    Diabetes       History of Present Illness: Ann Jarrett is a 56 y.o. female here for fu of  Type 2 Diabetes Mellitus. Chronic UTI, followed by urologist, on antibiotics, vaginal estrogen cream, Synjardy was discontinued  Off Actos as she gained weight  She is back on Synjardy no UTI for the last 3 and half months  She is compliant with her medications and has a gym at home, not able to use it  Tolerating Trulicity        DM hx  Type 2 Diabetes was diagnosed in 2003 . Cardiovascular risk factors: family history, diabetes mellitus, obesity, hypertension, post-menopausal         Wt Readings from Last 3 Encounters:   05/20/21 227 lb (103 kg)   01/20/21 230 lb 11.2 oz (104.6 kg)   10/19/20 219 lb (99.3 kg)       BP Readings from Last 3 Encounters:   05/20/21 (!) 168/72   01/20/21 129/63   10/19/20 (!) 140/64       Past Medical History:   Diagnosis Date    Diabetes mellitus     GERD (gastroesophageal reflux disease)     HTN (hypertension)     Hyperlipidemia      Current Outpatient Medications   Medication Sig    dulaglutide (Trulicity) 1.5 HU/1.4 mL sub-q pen 0.5 mL by SubCUTAneous route every seven (7) days.  metFORMIN ER (GLUCOPHAGE XR) 750 mg tablet Take 1 Tab by mouth two (2) times a day. Discontinue Synjardy    trimethoprim-sulfamethoxazole (BACTRIM, SEPTRA)  mg per tablet take 1 tablet by mouth once daily    plecanatide (Trulance) 3 mg tab Take 1 Tab by mouth daily. Trulance    glipiZIDE SR (GLUCOTROL XL) 5 mg CR tablet Take 1-2 Tabs by mouth daily.     flash glucose sensor (FreeStyle Ethan 14 Day Sensor) kit Use as directed every 14 days Dx Code: E11.65    flash glucose scanning reader (FreeStyle Ethan 14 Day [place]) misc Use as directed daily Dx Code: E11.65    glucose blood VI test strips (FreeStyle Lite Strips) strip Tests 3x daily Dx Code: E11.65    lancets (FreeStyle Lancets) 28 gauge misc Tests 3x daily Dx Code: E11.65    LINZESS 145 mcg cap capsule     Blood-Glucose Meter (FREESTYLE LITE METER) monitoring kit Test 3 times daily Dx Code: E11.65    tolterodine ER (DETROL LA) 4 mg ER capsule Take 4 mg by mouth daily.  cholecalciferol, VITAMIN D3, (VITAMIN D3) 5,000 unit tab tablet Take 5,000 Units by mouth daily.  omeprazole (PRILOSEC) 20 mg capsule Take 20 mg by mouth daily.  lisinopril (PRINIVIL, ZESTRIL) 20 mg tablet Take 20 mg by mouth daily.  simvastatin (ZOCOR) 40 mg tablet Take 40 mg by mouth nightly. No current facility-administered medications for this visit. No Known Allergies      Review of Systems:  - Per HPI  -   -     Physical Examination:   Blood pressure (!) 168/72, pulse 86, temperature 98.2 °F (36.8 °C), height 5' 10\" (1.778 m), weight 227 lb (103 kg), SpO2 99 %. Estimated body mass index is 32.57 kg/m² as calculated from the following:    Height as of this encounter: 5' 10\" (1.778 m). -   Weight as of this encounter: 227 lb (103 kg).   - General: pleasant, no distress, good eye contact  - HEENT: no pallor, no periorbital edema, EOMI  - Neck: supple,  - Cardiovascular: regular, normal rate, normal S1 and S2  - Respiratory: clear to auscultation bilaterally  -   - Musculoskeletal[de-identified] no edema,  - Neurological:alert and oriented  - Psychiatric: normal mood and affect  - Skin: color, texture, turgor normal.           Data Reviewed:         Lab Results   Component Value Date/Time    Hemoglobin A1c 7.4 (H) 05/13/2021 08:39 AM    Hemoglobin A1c 7.0 (H) 01/11/2021 09:05 AM    Hemoglobin A1c 7.7 (H) 10/15/2020 08:37 AM    Glucose 153 (H) 05/13/2021 08:39 AM    Glucose  10/16/2019 08:39 AM    Microalbumin/Creat ratio (mg/g creat) 7 02/19/2020 08:16 AM    Microalb/Creat ratio (ug/mg creat.) <4 01/11/2021 09:05 AM    Microalbumin,urine random 0.57 02/19/2020 08:16 AM    LDL,Direct 68 05/13/2021 08:39 AM    LDL, calculated 57 06/04/2019 08:25 AM    Creatinine 1.08 (H) 05/13/2021 08:39 AM    Hemoglobin A1c, External 8.0 05/24/2018 12:00 AM    Hemoglobin A1c, External 6.7 11/04/2016 12:00 AM      Lab Results   Component Value Date/Time    Cholesterol, total 158 06/04/2019 08:25 AM    HDL Cholesterol 32 (L) 06/04/2019 08:25 AM    LDL,Direct 68 05/13/2021 08:39 AM    LDL, calculated 57 06/04/2019 08:25 AM    Triglyceride 345 (H) 06/04/2019 08:25 AM     Lab Results   Component Value Date/Time    ALT (SGPT) 24 01/11/2021 09:05 AM    Alk. phosphatase 116 01/11/2021 09:05 AM    Bilirubin, total 0.5 01/11/2021 09:05 AM     Lab Results   Component Value Date/Time    GFR est AA >60 05/13/2021 08:39 AM    GFR est non-AA 51 (L) 05/13/2021 08:39 AM    Creatinine 1.08 (H) 05/13/2021 08:39 AM    BUN 20 05/13/2021 08:39 AM    Sodium 143 05/13/2021 08:39 AM    Potassium 4.3 05/13/2021 08:39 AM    Chloride 107 05/13/2021 08:39 AM    CO2 29 05/13/2021 08:39 AM      No results found for: TSH, TSHEXT       Assessment/Plan:     1. Type 2 Diabetes Mellitus with no known complications   Lab Results   Component Value Date/Time    Hemoglobin A1c 7.4 (H) 05/13/2021 08:39 AM    Hemoglobin A1c (POC) 7.9 07/15/2020 09:07 AM    Hemoglobin A1c, External 8.0 05/24/2018 12:00 AM     Stable, goal less than 7  Off insulin  Trulicity,glipizide   Synjardy       Bydureon - had velts, failed Bydureon in the past .Tried Tanzeum , Actos discontinued due to weight gain     FLU annually ,Pneumovax ,aspirin daily,annual eye exam,microalbumin. 2.  HTN : Continue current therapy     3. Hyperlipidemia : Continue present therapy    4. Obesity Body mass index is 32.57 kg/m². Oct 2019 surgery for Frozen shoulder     Thank you for allowing me to participate in the care of this patient.     Agatha Mendoza MD      Spent > 40 minutes on the day of the visit reviewing chart, examining, ordering/reviewing labs, counseling, discussing therapeutics and documentation in the medical record    Patient verbalized understanding

## 2021-09-15 PROBLEM — G56.20 LESION OF ULNAR NERVE: Status: ACTIVE | Noted: 2021-09-15

## 2021-09-15 PROBLEM — E78.1 ESSENTIAL HYPERTRIGLYCERIDEMIA: Status: ACTIVE | Noted: 2021-09-15

## 2021-09-22 ENCOUNTER — OFFICE VISIT (OUTPATIENT)
Dept: ENDOCRINOLOGY | Age: 61
End: 2021-09-22
Payer: OTHER GOVERNMENT

## 2021-09-22 VITALS
HEART RATE: 81 BPM | DIASTOLIC BLOOD PRESSURE: 60 MMHG | OXYGEN SATURATION: 100 % | SYSTOLIC BLOOD PRESSURE: 129 MMHG | HEIGHT: 70 IN | WEIGHT: 214 LBS | RESPIRATION RATE: 18 BRPM | TEMPERATURE: 97.4 F | BODY MASS INDEX: 30.64 KG/M2

## 2021-09-22 DIAGNOSIS — I10 ESSENTIAL HYPERTENSION: ICD-10-CM

## 2021-09-22 DIAGNOSIS — Z79.4 TYPE 2 DIABETES MELLITUS WITH HYPERGLYCEMIA, WITH LONG-TERM CURRENT USE OF INSULIN (HCC): Primary | ICD-10-CM

## 2021-09-22 DIAGNOSIS — Z79.4 TYPE 2 DIABETES MELLITUS WITH HYPERGLYCEMIA, WITH LONG-TERM CURRENT USE OF INSULIN (HCC): ICD-10-CM

## 2021-09-22 DIAGNOSIS — E78.2 MIXED HYPERLIPIDEMIA: ICD-10-CM

## 2021-09-22 DIAGNOSIS — E11.65 TYPE 2 DIABETES MELLITUS WITH HYPERGLYCEMIA, WITH LONG-TERM CURRENT USE OF INSULIN (HCC): Primary | ICD-10-CM

## 2021-09-22 DIAGNOSIS — E11.65 TYPE 2 DIABETES MELLITUS WITH HYPERGLYCEMIA, WITH LONG-TERM CURRENT USE OF INSULIN (HCC): ICD-10-CM

## 2021-09-22 PROCEDURE — 99214 OFFICE O/P EST MOD 30 MIN: CPT | Performed by: INTERNAL MEDICINE

## 2021-09-22 PROCEDURE — 3051F HG A1C>EQUAL 7.0%<8.0%: CPT | Performed by: INTERNAL MEDICINE

## 2021-09-22 RX ORDER — GLIPIZIDE 5 MG/1
5-10 TABLET, FILM COATED, EXTENDED RELEASE ORAL DAILY
Qty: 180 TABLET | Refills: 3 | Status: SHIPPED | OUTPATIENT
Start: 2021-09-22 | End: 2022-09-19 | Stop reason: SDUPTHER

## 2021-09-22 NOTE — LETTER
9/22/2021    Patient: Yamileth Irby   YOB: 1960   Date of Visit: 9/22/2021     Patricia Turner NP  [address] [address]  Via Fax: [phone]    Dear Patricia Turner NP,      Thank you for referring Ms. Gege Bryan to [address] [place] [address] for evaluation. My notes for this consultation are attached. If you have questions, please do not hesitate to call me. I look forward to following your patient along with you.       Sincerely,    Jenny Hernandez MD

## 2021-09-22 NOTE — PROGRESS NOTES
Chief Complaint   Patient presents with    Diabetes       History of Present Illness: Rafael Terry is a 59 y.o. female here for fu of  Type 2 Diabetes Mellitus. Chronic UTI, followed by urologist, on antibiotics, vaginal estrogen cream, Synjardy was discontinued  Off Actos as she gained weight  She is back on Synjardy no UTI ,  No hypoglycemia  Tolerating Trulicity  Lost weight      DM hx  Type 2 Diabetes was diagnosed in 1999  Cardiovascular risk factors: family history, diabetes mellitus, obesity, hypertension, post-menopausal         Wt Readings from Last 3 Encounters:   09/22/21 214 lb (97.1 kg)   05/20/21 227 lb (103 kg)   01/20/21 230 lb 11.2 oz (104.6 kg)       BP Readings from Last 3 Encounters:   09/22/21 129/60   05/20/21 (!) 168/72   01/20/21 129/63       Past Medical History:   Diagnosis Date    Diabetes mellitus     GERD (gastroesophageal reflux disease)     HTN (hypertension)     Hyperlipidemia      Current Outpatient Medications   Medication Sig    dulaglutide (Trulicity) 3 UU/0.0 mL pnij 3 mg by SubCUTAneous route every seven (7) days. Stop 1.5 mg    empagliflozin-metformin (Synjardy XR) 12.5-1,000 mg TBph Take 1 tablet   by mouth two (2) times a day, stop Metformin    flash glucose sensor (FreeStyle Ethan 14 Day Sensor) kit Use as directed every 14 days Dx Code: E11.65    flash glucose scanning reader (FreeStyle Ethan 14 Day [place]) misc Use as directed daily Dx Code: E11.65    glucose blood VI test strips (FreeStyle Lite Strips) strip Tests 3x daily Dx Code: E11.65    lancets (FreeStyle Lancets) 28 gauge misc Tests 3x daily Dx Code: E11.65    LINZESS 145 mcg cap capsule     Blood-Glucose Meter (FREESTYLE LITE METER) monitoring kit Test 3 times daily Dx Code: E11.65    tolterodine ER (DETROL LA) 4 mg ER capsule Take 4 mg by mouth daily.  cholecalciferol, VITAMIN D3, (VITAMIN D3) 5,000 unit tab tablet Take 5,000 Units by mouth daily.     omeprazole (PRILOSEC) 20 mg capsule Take 20 mg by mouth daily.  lisinopril (PRINIVIL, ZESTRIL) 20 mg tablet Take 20 mg by mouth daily.  simvastatin (ZOCOR) 40 mg tablet Take 40 mg by mouth nightly.  glipiZIDE SR (GLUCOTROL XL) 5 mg CR tablet Take 1-2 Tablets by mouth daily. No current facility-administered medications for this visit. No Known Allergies      Review of Systems:  - Per HPI  -   -     Physical Examination:   Blood pressure 129/60, pulse 81, temperature 97.4 °F (36.3 °C), temperature source Temporal, resp. rate 18, height 5' 10\" (1.778 m), weight 214 lb (97.1 kg), SpO2 100 %. Estimated body mass index is 30.71 kg/m² as calculated from the following:    Height as of this encounter: 5' 10\" (1.778 m). -   Weight as of this encounter: 214 lb (97.1 kg).   - General: pleasant, no distress, good eye contact  - HEENT: no pallor, no periorbital edema, EOMI  - Neck: supple,  - Cardiovascular: regular, normal rate, normal S1 and S2  - Respiratory: clear to auscultation bilaterally  -   - Musculoskeletal[de-identified] no edema,  - Neurological:alert and oriented  - Psychiatric: normal mood and affect  - Skin: color, texture, turgor normal.     Diabetic foot exam ; September 2021    H/o partial or complete amputation of foot : No  H/o previous foot ulceration : No  H/o pre - ulcerative callus : No  H/o peripheral neuropathy and callus : No  H/o poor circulation  : No  Foot deformity : Hammertoe        Data Reviewed:         Lab Results   Component Value Date/Time    Hemoglobin A1c 7.2 (H) 09/13/2021 01:32 PM    Hemoglobin A1c 7.4 (H) 05/13/2021 08:39 AM    Hemoglobin A1c 7.0 (H) 01/11/2021 09:05 AM    Glucose 98 09/13/2021 01:32 PM    Glucose  10/16/2019 08:39 AM    Microalbumin/Creat ratio (mg/g creat) 7 02/19/2020 08:16 AM    Microalb/Creat ratio (ug/mg creat.) <4 01/11/2021 09:05 AM    Microalbumin,urine random 0.57 02/19/2020 08:16 AM    LDL,Direct 68 05/13/2021 08:39 AM    LDL, calculated 57 06/04/2019 08:25 AM    Creatinine 1.19 (H) 09/13/2021 01:32 PM    Hemoglobin A1c, External 8.0 05/24/2018 12:00 AM    Hemoglobin A1c, External 6.7 11/04/2016 12:00 AM      Lab Results   Component Value Date/Time    Cholesterol, total 158 06/04/2019 08:25 AM    HDL Cholesterol 32 (L) 06/04/2019 08:25 AM    LDL,Direct 68 05/13/2021 08:39 AM    LDL, calculated 57 06/04/2019 08:25 AM    Triglyceride 345 (H) 06/04/2019 08:25 AM     Lab Results   Component Value Date/Time    ALT (SGPT) 24 01/11/2021 09:05 AM    Alk. phosphatase 116 01/11/2021 09:05 AM    Bilirubin, total 0.5 01/11/2021 09:05 AM     Lab Results   Component Value Date/Time    GFR est AA 56 (L) 09/13/2021 01:32 PM    GFR est non-AA 46 (L) 09/13/2021 01:32 PM    Creatinine 1.19 (H) 09/13/2021 01:32 PM    BUN 19 09/13/2021 01:32 PM    Sodium 139 09/13/2021 01:32 PM    Potassium 4.7 09/13/2021 01:32 PM    Chloride 108 09/13/2021 01:32 PM    CO2 26 09/13/2021 01:32 PM      No results found for: TSH, TSHEXT       Assessment/Plan:     1. Type 2 Diabetes Mellitus with no known complications   Lab Results   Component Value Date/Time    Hemoglobin A1c 7.2 (H) 09/13/2021 01:32 PM    Hemoglobin A1c (POC) 7.9 07/15/2020 09:07 AM    Hemoglobin A1c, External 8.0 05/24/2018 12:00 AM     Control improved  Off insulin  Trulicity,glipizide   Synjardy       Bydureon - had velts, failed Bydureon in the past .Tried Tanzeum , Actos discontinued due to weight gain     FLU annually ,Pneumovax ,aspirin daily,annual eye exam,microalbumin. 2.  HTN : Continue current therapy     3. Hyperlipidemia : Continue present therapy    4. Obesity Body mass index is 30.71 kg/m². Oct 2019 surgery for Frozen shoulder     Thank you for allowing me to participate in the care of this patient.     Scout Dsouza MD        Patient verbalized understanding

## 2021-09-22 NOTE — PROGRESS NOTES
Gerald Chester is a 59 y.o. female here for   Chief Complaint   Patient presents with    Diabetes       1. Have you been to the ER, urgent care clinic since your last visit? Hospitalized since your last visit? -no    2. Have you seen or consulted any other health care providers outside of the [address] [place] [address] since your last visit?   Include any pap smears or colon screening.-no

## 2022-01-09 DIAGNOSIS — Z79.4 TYPE 2 DIABETES MELLITUS WITH HYPERGLYCEMIA, WITH LONG-TERM CURRENT USE OF INSULIN (HCC): ICD-10-CM

## 2022-01-09 DIAGNOSIS — E11.65 TYPE 2 DIABETES MELLITUS WITH HYPERGLYCEMIA, WITH LONG-TERM CURRENT USE OF INSULIN (HCC): ICD-10-CM

## 2022-01-10 RX ORDER — LANCETS 28 GAUGE
EACH MISCELLANEOUS
Qty: 300 LANCET | Refills: 3 | Status: SHIPPED | OUTPATIENT
Start: 2022-01-10

## 2022-01-10 RX ORDER — BLOOD-GLUCOSE METER
KIT MISCELLANEOUS
Qty: 300 STRIP | Refills: 3 | Status: SHIPPED | OUTPATIENT
Start: 2022-01-10

## 2022-01-20 LAB
ALBUMIN/CREAT UR: <3 MG/G CREAT (ref 0–29)
BUN SERPL-MCNC: 18 MG/DL (ref 8–27)
BUN/CREAT SERPL: 19 (ref 12–28)
CALCIUM SERPL-MCNC: 10 MG/DL (ref 8.7–10.3)
CHLORIDE SERPL-SCNC: 105 MMOL/L (ref 96–106)
CHOLEST SERPL-MCNC: 141 MG/DL (ref 100–199)
CO2 SERPL-SCNC: 27 MMOL/L (ref 20–29)
CREAT SERPL-MCNC: 0.94 MG/DL (ref 0.57–1)
CREAT UR-MCNC: 86.4 MG/DL
EST. AVERAGE GLUCOSE BLD GHB EST-MCNC: 160 MG/DL
GLUCOSE SERPL-MCNC: 117 MG/DL (ref 65–99)
HBA1C MFR BLD: 7.2 % (ref 4.8–5.6)
HDLC SERPL-MCNC: 33 MG/DL
IMP & REVIEW OF LAB RESULTS: NORMAL
LDLC SERPL CALC-MCNC: 83 MG/DL (ref 0–99)
MICROALBUMIN UR-MCNC: <3 UG/ML
POTASSIUM SERPL-SCNC: 4.7 MMOL/L (ref 3.5–5.2)
SODIUM SERPL-SCNC: 144 MMOL/L (ref 134–144)
TRIGL SERPL-MCNC: 142 MG/DL (ref 0–149)
VLDLC SERPL CALC-MCNC: 25 MG/DL (ref 5–40)

## 2022-01-26 ENCOUNTER — OFFICE VISIT (OUTPATIENT)
Dept: ENDOCRINOLOGY | Age: 62
End: 2022-01-26
Payer: OTHER GOVERNMENT

## 2022-01-26 VITALS
OXYGEN SATURATION: 98 % | SYSTOLIC BLOOD PRESSURE: 125 MMHG | RESPIRATION RATE: 16 BRPM | WEIGHT: 207 LBS | BODY MASS INDEX: 29.63 KG/M2 | DIASTOLIC BLOOD PRESSURE: 62 MMHG | HEIGHT: 70 IN | HEART RATE: 87 BPM | TEMPERATURE: 97.2 F

## 2022-01-26 DIAGNOSIS — Z79.4 TYPE 2 DIABETES MELLITUS WITH HYPERGLYCEMIA, WITH LONG-TERM CURRENT USE OF INSULIN (HCC): Primary | ICD-10-CM

## 2022-01-26 DIAGNOSIS — I10 PRIMARY HYPERTENSION: ICD-10-CM

## 2022-01-26 DIAGNOSIS — E11.65 TYPE 2 DIABETES MELLITUS WITH HYPERGLYCEMIA, WITH LONG-TERM CURRENT USE OF INSULIN (HCC): Primary | ICD-10-CM

## 2022-01-26 DIAGNOSIS — E78.2 MIXED HYPERLIPIDEMIA: ICD-10-CM

## 2022-01-26 PROCEDURE — 99214 OFFICE O/P EST MOD 30 MIN: CPT | Performed by: INTERNAL MEDICINE

## 2022-01-26 PROCEDURE — 3051F HG A1C>EQUAL 7.0%<8.0%: CPT | Performed by: INTERNAL MEDICINE

## 2022-01-26 NOTE — LETTER
1/26/2022    Patient: Garland Winters   YOB: 1960   Date of Visit: 1/26/2022     Ramandeep Lanier DO  [place] [id number]  Via Fax: [phone]     Colten Sumner NP  [address] [address]  Via Fax: [phone]    Dear DO Colten Conner NP,      Thank you for referring Ms. Gege Cheng to [address] [place] [address] for evaluation. My notes for this consultation are attached. If you have questions, please do not hesitate to call me. I look forward to following your patient along with you.       Sincerely,    Debra Beltran MD

## 2022-01-26 NOTE — PROGRESS NOTES
Breonna Carballo is a 59 y.o. female here for   Chief Complaint   Patient presents with    Diabetes       1. Have you been to the ER, urgent care clinic since your last visit? Hospitalized since your last visit? -no    2. Have you seen or consulted any other health care providers outside of the [address] [place] [address] since your last visit?   Include any pap smears or colon screening.-no

## 2022-01-26 NOTE — PROGRESS NOTES
Chief Complaint   Patient presents with    Diabetes       History of Present Illness: Shayy Pennington is a 59 y.o. female here for fu of  Type 2 Diabetes Mellitus. Chronic UTI, followed by urologist, on antibiotics, vaginal estrogen cream, Synjardy was discontinued  Off Actos as she gained weight   She is back on Synjardy no UTI ,last UTI was a year ago   No hypoglycemia  Tolerating Trulicity  Lost weight      DM hx  Type 2 Diabetes was diagnosed in 1999  Cardiovascular risk factors: family history, diabetes mellitus, obesity, hypertension, post-menopausal         Wt Readings from Last 3 Encounters:   01/26/22 207 lb (93.9 kg)   09/22/21 214 lb (97.1 kg)   05/20/21 227 lb (103 kg)       BP Readings from Last 3 Encounters:   01/26/22 125/62   09/22/21 129/60   05/20/21 (!) 168/72       Past Medical History:   Diagnosis Date    Diabetes mellitus     GERD (gastroesophageal reflux disease)     HTN (hypertension)     Hyperlipidemia      Current Outpatient Medications   Medication Sig    glucose blood VI test strips (FreeStyle Lite Strips) strip Tests 3x daily Dx Code: E11.65    lancets (FreeStyle Lancets) 28 gauge misc Tests 3x daily Dx Code: E11.65    glipiZIDE SR (GLUCOTROL XL) 5 mg CR tablet Take 1-2 Tablets by mouth daily.  dulaglutide (Trulicity) 3 DC/2.9 mL pnij 3 mg by SubCUTAneous route every seven (7) days. Stop 1.5 mg    empagliflozin-metformin (Synjardy XR) 12.5-1,000 mg TBph Take 1 tablet   by mouth two (2) times a day, stop Metformin    Blood-Glucose Meter (FREESTYLE LITE METER) monitoring kit Test 3 times daily Dx Code: E11.65    tolterodine ER (DETROL LA) 4 mg ER capsule Take 4 mg by mouth daily.  omeprazole (PRILOSEC) 20 mg capsule Take 20 mg by mouth daily.  lisinopril (PRINIVIL, ZESTRIL) 20 mg tablet Take 20 mg by mouth daily.  simvastatin (ZOCOR) 40 mg tablet Take 40 mg by mouth nightly.     flash glucose sensor (FreeStyle Ethan 14 Day Sensor) kit Use as directed every 14 days Dx Code: E11.65 (Patient not taking: Reported on 1/26/2022)    flash glucose scanning reader ([employer] Ethan 14 Day [place]) misc Use as directed daily Dx Code: E11.65 (Patient not taking: Reported on 1/26/2022)    LINZESS 145 mcg cap capsule  (Patient not taking: Reported on 1/26/2022)    cholecalciferol, VITAMIN D3, (VITAMIN D3) 5,000 unit tab tablet Take 5,000 Units by mouth daily. (Patient not taking: Reported on 1/26/2022)     No current facility-administered medications for this visit. No Known Allergies      Review of Systems:  - Per HPI  -   -     Physical Examination:   Blood pressure 125/62, pulse 87, temperature 97.2 °F (36.2 °C), temperature source Temporal, resp. rate 16, height 5' 10\" (1.778 m), weight 207 lb (93.9 kg), SpO2 98 %. Estimated body mass index is 29.7 kg/m² as calculated from the following:    Height as of this encounter: 5' 10\" (1.778 m). -   Weight as of this encounter: 207 lb (93.9 kg).   - General: pleasant, no distress, good eye contact  - HEENT: no pallor, no periorbital edema, EOMI  - Neck: supple,  - Cardiovascular: regular, normal rate, normal S1 and S2  - Respiratory: clear to auscultation bilaterally  -   - Musculoskeletal[de-identified] no edema,  - Neurological:alert and oriented  - Psychiatric: normal mood and affect  - Skin: color, texture, turgor normal.     Diabetic foot exam ; September 2021    H/o partial or complete amputation of foot : No  H/o previous foot ulceration : No  H/o pre - ulcerative callus : No  H/o peripheral neuropathy and callus : No  H/o poor circulation  : No  Foot deformity : Hammertoe        Data Reviewed:         Lab Results   Component Value Date/Time    Hemoglobin A1c 7.2 (H) 01/18/2022 12:00 AM    Hemoglobin A1c 7.2 (H) 09/13/2021 01:32 PM    Hemoglobin A1c 7.4 (H) 05/13/2021 08:39 AM    Glucose 117 (H) 01/18/2022 12:00 AM    Glucose  10/16/2019 08:39 AM    Microalbumin/Creat ratio (mg/g creat) 7 02/19/2020 08:16 AM    Microalb/Creat ratio (ug/mg creat.) <3 01/18/2022 12:00 AM    Microalbumin,urine random 0.57 02/19/2020 08:16 AM    LDL,Direct 68 05/13/2021 08:39 AM    LDL, calculated 83 01/18/2022 12:00 AM    LDL, calculated 57 06/04/2019 08:25 AM    Creatinine 0.94 01/18/2022 12:00 AM    Hemoglobin A1c, External 8.0 05/24/2018 12:00 AM    Hemoglobin A1c, External 6.7 11/04/2016 12:00 AM      Lab Results   Component Value Date/Time    Cholesterol, total 141 01/18/2022 12:00 AM    HDL Cholesterol 33 (L) 01/18/2022 12:00 AM    LDL,Direct 68 05/13/2021 08:39 AM    LDL, calculated 83 01/18/2022 12:00 AM    LDL, calculated 57 06/04/2019 08:25 AM    Triglyceride 142 01/18/2022 12:00 AM     Lab Results   Component Value Date/Time    ALT (SGPT) 24 01/11/2021 09:05 AM    Alk. phosphatase 116 01/11/2021 09:05 AM    Bilirubin, total 0.5 01/11/2021 09:05 AM     Lab Results   Component Value Date/Time    GFR est AA 75 01/18/2022 12:00 AM    GFR est non-AA 65 01/18/2022 12:00 AM    Creatinine 0.94 01/18/2022 12:00 AM    BUN 18 01/18/2022 12:00 AM    Sodium 144 01/18/2022 12:00 AM    Potassium 4.7 01/18/2022 12:00 AM    Chloride 105 01/18/2022 12:00 AM    CO2 27 01/18/2022 12:00 AM      No results found for: TSH, TSHEXT       Assessment/Plan:     1. Type 2 Diabetes Mellitus with no known complications   Lab Results   Component Value Date/Time    Hemoglobin A1c 7.2 (H) 01/18/2022 12:00 AM    Hemoglobin A1c (POC) 7.9 07/15/2020 09:07 AM    Hemoglobin A1c, External 8.0 05/24/2018 12:00 AM   Controlled  Off insulin  Trulicity,glipizide, change to twice daily instead of 2 tabs at dinner  Synjardy   Few blood glucose in the morning are lower    Bydureon - had velts, failed Bydureon in the past .Tried Tanzeum , Actos discontinued due to weight gain     FLU annually ,Pneumovax ,aspirin daily,annual eye exam,microalbumin. 2.  HTN : Continue current therapy     3. Hyperlipidemia : Continue present therapy    4. Obesity Body mass index is 29.7 kg/m².     Oct 2019 surgery for Frozen shoulder     Thank you for allowing me to participate in the care of this patient.     Alfredo Bruno MD        Patient verbalized understanding

## 2022-03-18 PROBLEM — G56.20 LESION OF ULNAR NERVE: Status: ACTIVE | Noted: 2021-09-15

## 2022-03-19 PROBLEM — M75.40 IMPINGEMENT SYNDROME OF SHOULDER REGION: Status: ACTIVE | Noted: 2018-12-20

## 2022-03-19 PROBLEM — E78.1 ESSENTIAL HYPERTRIGLYCERIDEMIA: Status: ACTIVE | Noted: 2021-09-15

## 2022-03-20 PROBLEM — M75.02 ADHESIVE CAPSULITIS OF LEFT SHOULDER: Status: ACTIVE | Noted: 2018-12-20

## 2022-03-20 PROBLEM — E11.40 TYPE 2 DIABETES MELLITUS WITH DIABETIC NEUROPATHY (HCC): Status: ACTIVE | Noted: 2019-10-16

## 2022-03-21 DIAGNOSIS — E11.65 TYPE 2 DIABETES MELLITUS WITH HYPERGLYCEMIA, UNSPECIFIED WHETHER LONG TERM INSULIN USE (HCC): ICD-10-CM

## 2022-03-21 PROCEDURE — 3051F HG A1C>EQUAL 7.0%<8.0%: CPT | Performed by: INTERNAL MEDICINE

## 2022-03-21 RX ORDER — DULAGLUTIDE 3 MG/.5ML
INJECTION, SOLUTION SUBCUTANEOUS
Qty: 12 EACH | Refills: 3 | Status: SHIPPED | OUTPATIENT
Start: 2022-03-21 | End: 2022-09-06 | Stop reason: SDUPTHER

## 2022-05-02 DIAGNOSIS — E11.65 TYPE 2 DIABETES MELLITUS WITH HYPERGLYCEMIA, UNSPECIFIED WHETHER LONG TERM INSULIN USE (HCC): ICD-10-CM

## 2022-05-02 RX ORDER — EMPAGLIFLOZIN, METFORMIN HYDROCHLORIDE 12.5; 1 MG/1; MG/1
TABLET, EXTENDED RELEASE ORAL
Qty: 180 EACH | Refills: 3 | Status: SHIPPED | OUTPATIENT
Start: 2022-05-02

## 2022-05-04 LAB
BUN SERPL-MCNC: 19 MG/DL (ref 8–27)
BUN/CREAT SERPL: 21 (ref 12–28)
CALCIUM SERPL-MCNC: 9.9 MG/DL (ref 8.7–10.3)
CHLORIDE SERPL-SCNC: 104 MMOL/L (ref 96–106)
CO2 SERPL-SCNC: 22 MMOL/L (ref 20–29)
CREAT SERPL-MCNC: 0.9 MG/DL (ref 0.57–1)
EGFR: 72 ML/MIN/1.73
EST. AVERAGE GLUCOSE BLD GHB EST-MCNC: 160 MG/DL
GLUCOSE SERPL-MCNC: 136 MG/DL (ref 65–99)
HBA1C MFR BLD: 7.2 % (ref 4.8–5.6)
POTASSIUM SERPL-SCNC: 4.8 MMOL/L (ref 3.5–5.2)
SODIUM SERPL-SCNC: 145 MMOL/L (ref 134–144)

## 2022-05-25 ENCOUNTER — OFFICE VISIT (OUTPATIENT)
Dept: ENDOCRINOLOGY | Age: 62
End: 2022-05-25
Payer: OTHER GOVERNMENT

## 2022-05-25 VITALS
HEIGHT: 70 IN | TEMPERATURE: 97.1 F | OXYGEN SATURATION: 100 % | WEIGHT: 204 LBS | DIASTOLIC BLOOD PRESSURE: 56 MMHG | HEART RATE: 82 BPM | BODY MASS INDEX: 29.2 KG/M2 | RESPIRATION RATE: 18 BRPM | SYSTOLIC BLOOD PRESSURE: 148 MMHG

## 2022-05-25 DIAGNOSIS — I10 PRIMARY HYPERTENSION: ICD-10-CM

## 2022-05-25 DIAGNOSIS — E11.65 TYPE 2 DIABETES MELLITUS WITH HYPERGLYCEMIA, UNSPECIFIED WHETHER LONG TERM INSULIN USE (HCC): Primary | ICD-10-CM

## 2022-05-25 DIAGNOSIS — E78.2 MIXED HYPERLIPIDEMIA: ICD-10-CM

## 2022-05-25 PROCEDURE — 99214 OFFICE O/P EST MOD 30 MIN: CPT | Performed by: INTERNAL MEDICINE

## 2022-05-25 PROCEDURE — 3051F HG A1C>EQUAL 7.0%<8.0%: CPT | Performed by: INTERNAL MEDICINE

## 2022-05-25 NOTE — PROGRESS NOTES
Adolfo Araujo is a 59 y.o. female here for   Chief Complaint   Patient presents with    Diabetes       1. Have you been to the ER, urgent care clinic since your last visit? Hospitalized since your last visit? -no    2. Have you seen or consulted any other health care providers outside of the [address] [place] [address] since your last visit?   Include any pap smears or colon screening.-not sure

## 2022-05-25 NOTE — PROGRESS NOTES
Chief Complaint   Patient presents with    Diabetes       History of Present Illness: Basilia Eisenberg is a 59 y.o. female here for fu of  Type 2 Diabetes Mellitus. Chronic UTI, followed by urologist, on antibiotics, vaginal estrogen cream, Synjardy was discontinued  Off Actos as she gained weight   She is back on Synjardy no UTI ,last UTI was a year ago   No hypoglycemia  Tolerating Trulicity  Lost weight due to Covid May 2022       DM hx  Type 2 Diabetes was diagnosed in 1999  Cardiovascular risk factors: family history, diabetes mellitus, obesity, hypertension, post-menopausal         Wt Readings from Last 3 Encounters:   05/25/22 204 lb (92.5 kg)   01/26/22 207 lb (93.9 kg)   09/22/21 214 lb (97.1 kg)       BP Readings from Last 3 Encounters:   05/25/22 (!) 148/56   01/26/22 125/62   09/22/21 129/60       Past Medical History:   Diagnosis Date    COVID-19 05/2022    Diabetes mellitus     GERD (gastroesophageal reflux disease)     HTN (hypertension)     Hyperlipidemia      Current Outpatient Medications   Medication Sig    empagliflozin-metformin (Synjardy XR) 12.5-1,000 mg TBph TAKE 1 TABLET TWICE A DAY    Trulicity 3 QG/7.7 mL pnij INJECT 3 MG UNDER THE SKIN EVERY 7 DAYS (STOP 1.5 MG)    glucose blood VI test strips (FreeStyle Lite Strips) strip Tests 3x daily Dx Code: E11.65    lancets (FreeStyle Lancets) 28 gauge misc Tests 3x daily Dx Code: E11.65    glipiZIDE SR (GLUCOTROL XL) 5 mg CR tablet Take 1-2 Tablets by mouth daily.  Blood-Glucose Meter (FREESTYLE LITE METER) monitoring kit Test 3 times daily Dx Code: E11.65    tolterodine ER (DETROL LA) 4 mg ER capsule Take 4 mg by mouth daily.  omeprazole (PRILOSEC) 20 mg capsule Take 20 mg by mouth daily.  lisinopril (PRINIVIL, ZESTRIL) 20 mg tablet Take 20 mg by mouth daily.  simvastatin (ZOCOR) 40 mg tablet Take 40 mg by mouth nightly. No current facility-administered medications for this visit.      No Known Allergies      Review of Systems:  - Per HPI  -   -     Physical Examination:   Blood pressure (!) 148/56, pulse 82, temperature 97.1 °F (36.2 °C), temperature source Temporal, resp. rate 18, height 5' 10\" (1.778 m), weight 204 lb (92.5 kg), SpO2 100 %. Estimated body mass index is 29.27 kg/m² as calculated from the following:    Height as of this encounter: 5' 10\" (1.778 m). -   Weight as of this encounter: 204 lb (92.5 kg).   - General: pleasant, no distress, good eye contact  - HEENT: no pallor, no periorbital edema, EOMI  - Neck: supple,  - Cardiovascular: regular, normal rate, normal S1 and S2  - Respiratory: clear to auscultation bilaterally  -   - Musculoskeletal[de-identified] no edema,  - Neurological:alert and oriented  - Psychiatric: normal mood and affect  - Skin: color, texture, turgor normal.     Diabetic foot exam ; September 2021    H/o partial or complete amputation of foot : No  H/o previous foot ulceration : No  H/o pre - ulcerative callus : No  H/o peripheral neuropathy and callus : No  H/o poor circulation  : No  Foot deformity : Hammertoe        Data Reviewed:         Lab Results   Component Value Date/Time    Hemoglobin A1c 7.2 (H) 05/03/2022 12:00 AM    Hemoglobin A1c 7.2 (H) 01/18/2022 12:00 AM    Hemoglobin A1c 7.2 (H) 09/13/2021 01:32 PM    Glucose 136 (H) 05/03/2022 12:00 AM    Glucose  10/16/2019 08:39 AM    Microalbumin/Creat ratio (mg/g creat) 7 02/19/2020 08:16 AM    Microalb/Creat ratio (ug/mg creat.) <3 01/18/2022 12:00 AM    Microalbumin,urine random 0.57 02/19/2020 08:16 AM    LDL,Direct 68 05/13/2021 08:39 AM    LDL, calculated 83 01/18/2022 12:00 AM    LDL, calculated 57 06/04/2019 08:25 AM    Creatinine 0.90 05/03/2022 12:00 AM    Hemoglobin A1c, External 8.0 05/24/2018 12:00 AM    Hemoglobin A1c, External 6.7 11/04/2016 12:00 AM      Lab Results   Component Value Date/Time    Cholesterol, total 141 01/18/2022 12:00 AM    HDL Cholesterol 33 (L) 01/18/2022 12:00 AM    LDL,Direct 68 05/13/2021 08:39 AM    LDL, calculated 83 01/18/2022 12:00 AM    LDL, calculated 57 06/04/2019 08:25 AM    Triglyceride 142 01/18/2022 12:00 AM     Lab Results   Component Value Date/Time    ALT (SGPT) 24 01/11/2021 09:05 AM    Alk. phosphatase 116 01/11/2021 09:05 AM    Bilirubin, total 0.5 01/11/2021 09:05 AM     Lab Results   Component Value Date/Time    GFR est AA 75 01/18/2022 12:00 AM    GFR est non-AA 65 01/18/2022 12:00 AM    Creatinine 0.90 05/03/2022 12:00 AM    BUN 19 05/03/2022 12:00 AM    Sodium 145 (H) 05/03/2022 12:00 AM    Potassium 4.8 05/03/2022 12:00 AM    Chloride 104 05/03/2022 12:00 AM    CO2 22 05/03/2022 12:00 AM      No results found for: TSH, TSHEXT       Assessment/Plan:     1. Type 2 Diabetes Mellitus with no known complications   Lab Results   Component Value Date/Time    Hemoglobin A1c 7.2 (H) 05/03/2022 12:00 AM    Hemoglobin A1c (POC) 7.9 07/15/2020 09:07 AM    Hemoglobin A1c, External 8.0 05/24/2018 12:00 AM   Controlled  Off insulin  Trulicity,glipizide, change to twice daily instead of 2 tabs at dinner  Synjardy   Few blood glucose in the morning are lower    Bydureon - had velts, failed Bydureon in the past .Tried Tanzeum , Actos discontinued due to weight gain     FLU annually ,Pneumovax ,aspirin daily,annual eye exam,microalbumin. 2.  HTN : Continue current therapy     3. Hyperlipidemia : Continue present therapy    4. Obesity Body mass index is 29.27 kg/m². Oct 2019 surgery for Frozen shoulder     Thank you for allowing me to participate in the care of this patient.     Katerina Meléndez MD        Patient verbalized understanding

## 2022-05-25 NOTE — LETTER
5/25/2022    Patient: Coty Shaffer   YOB: 1960   Date of Visit: 5/25/2022     Gretchen Nair DO  [place] [address] [address]  Via Fax: [phone]    Dear Gretchen Nair DO,      Thank you for referring Ms. Gege Cee to [address] [place] [address] for evaluation. My notes for this consultation are attached. If you have questions, please do not hesitate to call me. I look forward to following your patient along with you.       Sincerely,    Cornelio Boyd MD

## 2022-09-19 DIAGNOSIS — I10 ESSENTIAL HYPERTENSION: ICD-10-CM

## 2022-09-19 DIAGNOSIS — E78.2 MIXED HYPERLIPIDEMIA: ICD-10-CM

## 2022-09-19 DIAGNOSIS — Z79.4 TYPE 2 DIABETES MELLITUS WITH HYPERGLYCEMIA, WITH LONG-TERM CURRENT USE OF INSULIN (HCC): ICD-10-CM

## 2022-09-19 DIAGNOSIS — E11.65 TYPE 2 DIABETES MELLITUS WITH HYPERGLYCEMIA, WITH LONG-TERM CURRENT USE OF INSULIN (HCC): ICD-10-CM

## 2022-09-20 RX ORDER — GLIPIZIDE 5 MG/1
5-10 TABLET, FILM COATED, EXTENDED RELEASE ORAL DAILY
Qty: 180 TABLET | Refills: 3 | Status: SHIPPED | OUTPATIENT
Start: 2022-09-20

## 2022-11-24 LAB
HBA1C MFR BLD HPLC: 7.4 %
MICROALBUMIN/CREATININE RATIO, EXTERNAL: <3

## 2023-01-03 ENCOUNTER — TELEPHONE (OUTPATIENT)
Dept: ENDOCRINOLOGY | Age: 63
End: 2023-01-03

## 2023-01-03 DIAGNOSIS — E11.65 TYPE 2 DIABETES MELLITUS WITH HYPERGLYCEMIA, WITH LONG-TERM CURRENT USE OF INSULIN (HCC): ICD-10-CM

## 2023-01-03 DIAGNOSIS — Z79.4 TYPE 2 DIABETES MELLITUS WITH HYPERGLYCEMIA, WITH LONG-TERM CURRENT USE OF INSULIN (HCC): ICD-10-CM

## 2023-01-03 RX ORDER — BLOOD-GLUCOSE METER
KIT MISCELLANEOUS
Qty: 300 STRIP | Refills: 3 | Status: SHIPPED | OUTPATIENT
Start: 2023-01-03

## 2023-01-03 RX ORDER — LANCETS 28 GAUGE
EACH MISCELLANEOUS
Qty: 300 LANCET | Refills: 3 | Status: SHIPPED | OUTPATIENT
Start: 2023-01-03

## 2023-01-12 ENCOUNTER — PATIENT MESSAGE (OUTPATIENT)
Dept: ENDOCRINOLOGY | Age: 63
End: 2023-01-12

## 2023-01-12 DIAGNOSIS — E11.65 TYPE 2 DIABETES MELLITUS WITH HYPERGLYCEMIA, UNSPECIFIED WHETHER LONG TERM INSULIN USE (HCC): ICD-10-CM

## 2023-01-12 RX ORDER — DULAGLUTIDE 3 MG/.5ML
3 INJECTION, SOLUTION SUBCUTANEOUS
Qty: 4 EACH | Refills: 3 | Status: SHIPPED | OUTPATIENT
Start: 2023-01-12

## 2023-01-12 NOTE — TELEPHONE ENCOUNTER
----- Message from Adam Barkley sent at 1/12/2023  1:14 PM EST -----  Regarding: Trulicity 3mg  Dr. Isela Crooks, can you send a prescription for my Trulicity prescription to [facility]. [facility] informed [facility] dont have it to refill at this time. [facility] says they have it but I can only get 28 day supply every month.    Thank you, Flores Reid   [phone]

## 2023-03-02 ENCOUNTER — OFFICE VISIT (OUTPATIENT)
Dept: ENDOCRINOLOGY | Age: 63
End: 2023-03-02
Payer: OTHER GOVERNMENT

## 2023-03-02 VITALS
DIASTOLIC BLOOD PRESSURE: 65 MMHG | HEIGHT: 70 IN | WEIGHT: 205.6 LBS | TEMPERATURE: 97.1 F | BODY MASS INDEX: 29.43 KG/M2 | SYSTOLIC BLOOD PRESSURE: 143 MMHG | HEART RATE: 81 BPM | OXYGEN SATURATION: 97 %

## 2023-03-02 DIAGNOSIS — E78.2 MIXED HYPERLIPIDEMIA: ICD-10-CM

## 2023-03-02 DIAGNOSIS — E11.65 TYPE 2 DIABETES MELLITUS WITH HYPERGLYCEMIA, UNSPECIFIED WHETHER LONG TERM INSULIN USE (HCC): Primary | ICD-10-CM

## 2023-03-02 DIAGNOSIS — I10 ESSENTIAL HYPERTENSION: ICD-10-CM

## 2023-03-02 LAB — HBA1C MFR BLD HPLC: 7.1 %

## 2023-03-02 PROCEDURE — 83036 HEMOGLOBIN GLYCOSYLATED A1C: CPT | Performed by: INTERNAL MEDICINE

## 2023-03-02 PROCEDURE — 3078F DIAST BP <80 MM HG: CPT | Performed by: INTERNAL MEDICINE

## 2023-03-02 PROCEDURE — 99214 OFFICE O/P EST MOD 30 MIN: CPT | Performed by: INTERNAL MEDICINE

## 2023-03-02 PROCEDURE — 3077F SYST BP >= 140 MM HG: CPT | Performed by: INTERNAL MEDICINE

## 2023-03-02 PROCEDURE — 3051F HG A1C>EQUAL 7.0%<8.0%: CPT | Performed by: INTERNAL MEDICINE

## 2023-03-02 RX ORDER — DULAGLUTIDE 3 MG/.5ML
3 INJECTION, SOLUTION SUBCUTANEOUS
Qty: 12 EACH | Refills: 3 | Status: SHIPPED | OUTPATIENT
Start: 2023-03-02

## 2023-03-02 NOTE — PROGRESS NOTES
Mitesh Augustin is a 57 y.o. female here for   Chief Complaint   Patient presents with    Diabetes       1. Have you been to the ER, urgent care clinic since your last visit? Hospitalized since your last visit? - No     2. Have you seen or consulted any other health care providers outside of the [address] [place] [address] since your last visit?   Include any pap smears or colon screening.- PCP

## 2023-03-02 NOTE — PROGRESS NOTES
Chief Complaint   Patient presents with    Diabetes     History of Present Illness: Levi Post is a 57 y.o. female here for fu of  Type 2 Diabetes Mellitus. 3/2/23  AM BG   No hypoglycemia  No frequent UTI   Taking meds consistently  Not drinking enough water  Less active  Eye exam - up to date, no issues    Prior history   Chronic UTI, followed by urologist, on antibiotics, vaginal estrogen cream, Synjardy was discontinued  Off Actos as she gained weight   She is back on Synjardy no UTI, last UTI was a year ago   No hypoglycemia  Tolerating Trulicity  Lost weight due to Covid May 2022       DM hx  Type 2 Diabetes was diagnosed in 1999  Cardiovascular risk factors: family history, diabetes mellitus, obesity, hypertension, post-menopausal       Wt Readings from Last 3 Encounters:   05/25/22 204 lb (92.5 kg)   01/26/22 207 lb (93.9 kg)   09/22/21 214 lb (97.1 kg)       BP Readings from Last 3 Encounters:   05/25/22 (!) 148/56   01/26/22 125/62   09/22/21 129/60       Past Medical History:   Diagnosis Date    COVID-19 05/2022    Diabetes mellitus     GERD (gastroesophageal reflux disease)     HTN (hypertension)     Hyperlipidemia      Current Outpatient Medications   Medication Sig    dulaglutide (Trulicity) 3 TH/1.6 mL pnij 3 mg by SubCUTAneous route every seven (7) days. glucose blood VI test strips (FreeStyle Lite Strips) strip Tests 3x daily Dx Code: E11.65    lancets (FreeStyle Lancets) 28 gauge misc Tests 3x daily Dx Code: E11.65    glipiZIDE SR (GLUCOTROL XL) 5 mg CR tablet Take 1-2 Tablets by mouth daily. empagliflozin-metformin (Synjardy XR) 12.5-1,000 mg TBph TAKE 1 TABLET TWICE A DAY    Blood-Glucose Meter (FREESTYLE LITE METER) monitoring kit Test 3 times daily Dx Code: E11.65    tolterodine ER (DETROL LA) 4 mg ER capsule Take 4 mg by mouth daily. omeprazole (PRILOSEC) 20 mg capsule Take 20 mg by mouth daily.     lisinopril (PRINIVIL, ZESTRIL) 20 mg tablet Take 20 mg by mouth daily.    simvastatin (ZOCOR) 40 mg tablet Take 40 mg by mouth nightly. No current facility-administered medications for this visit. No Known Allergies      Review of Systems:  Per HPI        Physical Examination:   There were no vitals taken for this visit. Estimated body mass index is 29.27 kg/m² as calculated from the following:    Height as of 5/25/22: 5' 10\" (1.778 m). Weight as of 5/25/22: 204 lb (92.5 kg).   General: pleasant, no distress, good eye contact  HEENT: no pallor, no periorbital edema, EOMI  Neck: supple,  Cardiovascular: regular, normal rate, normal S1 and S2  Respiratory: clear to auscultation bilaterally    Musculoskeletal[de-identified] no edema,  Neurological:alert and oriented  Psychiatric: normal mood and affect  Skin: color, texture, turgor normal.     Diabetic foot exam ; September 2021    H/o partial or complete amputation of foot : No  H/o previous foot ulceration : No  H/o pre - ulcerative callus : No  H/o peripheral neuropathy and callus : No  H/o poor circulation  : No  Foot deformity : Hammertoe        Data Reviewed:         Lab Results   Component Value Date/Time    Hemoglobin A1c 7.1 (H) 10/18/2022 09:09 AM    Hemoglobin A1c 7.2 (H) 05/03/2022 12:00 AM    Hemoglobin A1c 7.2 (H) 01/18/2022 12:00 AM    Glucose 114 (H) 10/18/2022 09:09 AM    Glucose  10/16/2019 08:39 AM    Microalbumin/Creat ratio (mg/g creat) 7 10/18/2022 09:09 AM    Microalbumin,urine random 0.99 10/18/2022 09:09 AM    LDL,Direct 109 (H) 10/18/2022 09:09 AM    LDL, calculated 83 01/18/2022 12:00 AM    LDL, calculated 57 06/04/2019 08:25 AM    Creatinine 1.19 (H) 10/18/2022 09:09 AM    Hemoglobin A1c, External 8.0 05/24/2018 12:00 AM    Hemoglobin A1c, External 6.7 11/04/2016 12:00 AM      Lab Results   Component Value Date/Time    Cholesterol, total 141 01/18/2022 12:00 AM    HDL Cholesterol 33 (L) 01/18/2022 12:00 AM    LDL,Direct 109 (H) 10/18/2022 09:09 AM    LDL, calculated 83 01/18/2022 12:00 AM    LDL, calculated 57 06/04/2019 08:25 AM    Triglyceride 142 01/18/2022 12:00 AM     Lab Results   Component Value Date/Time    ALT (SGPT) 24 01/11/2021 09:05 AM    Alk. phosphatase 116 01/11/2021 09:05 AM    Bilirubin, total 0.5 01/11/2021 09:05 AM     Lab Results   Component Value Date/Time    GFR est AA 75 01/18/2022 12:00 AM    GFR est non-AA 65 01/18/2022 12:00 AM    Creatinine 1.19 (H) 10/18/2022 09:09 AM    BUN 20 10/18/2022 09:09 AM    Sodium 140 10/18/2022 09:09 AM    Potassium 4.3 10/18/2022 09:09 AM    Chloride 105 10/18/2022 09:09 AM    CO2 29 10/18/2022 09:09 AM      No results found for: TSH, TSHEXT       Assessment/Plan:     1. Type 2 Diabetes Mellitus with no known complications   Lab Results   Component Value Date/Time    Hemoglobin A1c 7.1 (H) 10/18/2022 09:09 AM    Hemoglobin A1c (POC) 7.9 07/15/2020 09:07 AM    Hemoglobin A1c, External 8.0 05/24/2018 12:00 AM   Controlled  Off insulin  Trulicity,glipizide, change to twice daily instead of 2 tabs at dinner  Synjardy       Bydureon - had velts, failed Bydureon in the past .Tried Tanzeum , Actos discontinued due to weight gain     FLU annually ,Pneumovax ,aspirin daily,annual eye exam,microalbumin. 2.  HTN : Continue current therapy     3. Hyperlipidemia : Continue present therapy    4. Obesity There is no height or weight on file to calculate BMI. Oct 2019 surgery for Frozen shoulder     Thank you for allowing me to participate in the care of this patient.     Alfredo Hutchins MD        Patient verbalized understanding

## 2023-04-22 DIAGNOSIS — E11.65 TYPE 2 DIABETES MELLITUS WITH HYPERGLYCEMIA, UNSPECIFIED WHETHER LONG TERM INSULIN USE (HCC): Primary | ICD-10-CM

## 2023-04-22 DIAGNOSIS — E78.2 MIXED HYPERLIPIDEMIA: ICD-10-CM

## 2023-04-25 DIAGNOSIS — E11.65 TYPE 2 DIABETES MELLITUS WITH HYPERGLYCEMIA, UNSPECIFIED WHETHER LONG TERM INSULIN USE (HCC): ICD-10-CM

## 2023-04-25 RX ORDER — EMPAGLIFLOZIN, METFORMIN HYDROCHLORIDE 12.5; 1 MG/1; MG/1
TABLET, EXTENDED RELEASE ORAL
Qty: 180 EACH | Refills: 3 | Status: SHIPPED | OUTPATIENT
Start: 2023-04-25

## 2023-08-31 ENCOUNTER — NURSE ONLY (OUTPATIENT)
Age: 63
End: 2023-08-31

## 2023-08-31 DIAGNOSIS — E78.2 MIXED HYPERLIPIDEMIA: ICD-10-CM

## 2023-08-31 DIAGNOSIS — E11.65 TYPE 2 DIABETES MELLITUS WITH HYPERGLYCEMIA, UNSPECIFIED WHETHER LONG TERM INSULIN USE (HCC): ICD-10-CM

## 2023-08-31 LAB
ANION GAP SERPL CALC-SCNC: 5 MMOL/L (ref 5–15)
BUN SERPL-MCNC: 14 MG/DL (ref 6–20)
BUN/CREAT SERPL: 13 (ref 12–20)
CALCIUM SERPL-MCNC: 9.7 MG/DL (ref 8.5–10.1)
CHLORIDE SERPL-SCNC: 107 MMOL/L (ref 97–108)
CO2 SERPL-SCNC: 29 MMOL/L (ref 21–32)
CREAT SERPL-MCNC: 1.07 MG/DL (ref 0.55–1.02)
EST. AVERAGE GLUCOSE BLD GHB EST-MCNC: 166 MG/DL
GLUCOSE SERPL-MCNC: 147 MG/DL (ref 65–100)
HBA1C MFR BLD: 7.4 % (ref 4–5.6)
POTASSIUM SERPL-SCNC: 4.3 MMOL/L (ref 3.5–5.1)
SODIUM SERPL-SCNC: 141 MMOL/L (ref 136–145)

## 2023-09-07 ENCOUNTER — OFFICE VISIT (OUTPATIENT)
Age: 63
End: 2023-09-07
Payer: MEDICARE

## 2023-09-07 VITALS
SYSTOLIC BLOOD PRESSURE: 127 MMHG | HEIGHT: 70 IN | HEART RATE: 80 BPM | TEMPERATURE: 98.2 F | DIASTOLIC BLOOD PRESSURE: 65 MMHG | RESPIRATION RATE: 18 BRPM | BODY MASS INDEX: 29.12 KG/M2 | OXYGEN SATURATION: 99 % | WEIGHT: 203.4 LBS

## 2023-09-07 DIAGNOSIS — I10 ESSENTIAL HYPERTENSION: ICD-10-CM

## 2023-09-07 DIAGNOSIS — E11.65 TYPE 2 DIABETES MELLITUS WITH HYPERGLYCEMIA, UNSPECIFIED WHETHER LONG TERM INSULIN USE (HCC): Primary | ICD-10-CM

## 2023-09-07 DIAGNOSIS — E78.2 MIXED HYPERLIPIDEMIA: ICD-10-CM

## 2023-09-07 PROCEDURE — 99214 OFFICE O/P EST MOD 30 MIN: CPT | Performed by: INTERNAL MEDICINE

## 2023-09-07 PROCEDURE — G8400 PT W/DXA NO RESULTS DOC: HCPCS | Performed by: INTERNAL MEDICINE

## 2023-09-07 PROCEDURE — G8419 CALC BMI OUT NRM PARAM NOF/U: HCPCS | Performed by: INTERNAL MEDICINE

## 2023-09-07 PROCEDURE — 1123F ACP DISCUSS/DSCN MKR DOCD: CPT | Performed by: INTERNAL MEDICINE

## 2023-09-07 PROCEDURE — 1036F TOBACCO NON-USER: CPT | Performed by: INTERNAL MEDICINE

## 2023-09-07 PROCEDURE — 3051F HG A1C>EQUAL 7.0%<8.0%: CPT | Performed by: INTERNAL MEDICINE

## 2023-09-07 PROCEDURE — G8427 DOCREV CUR MEDS BY ELIG CLIN: HCPCS | Performed by: INTERNAL MEDICINE

## 2023-09-07 PROCEDURE — 3078F DIAST BP <80 MM HG: CPT | Performed by: INTERNAL MEDICINE

## 2023-09-07 PROCEDURE — 3017F COLORECTAL CA SCREEN DOC REV: CPT | Performed by: INTERNAL MEDICINE

## 2023-09-07 PROCEDURE — 3074F SYST BP LT 130 MM HG: CPT | Performed by: INTERNAL MEDICINE

## 2023-09-07 PROCEDURE — 1090F PRES/ABSN URINE INCON ASSESS: CPT | Performed by: INTERNAL MEDICINE

## 2023-09-07 PROCEDURE — 2022F DILAT RTA XM EVC RTNOPTHY: CPT | Performed by: INTERNAL MEDICINE

## 2023-09-07 RX ORDER — GLIPIZIDE 5 MG/1
TABLET, FILM COATED, EXTENDED RELEASE ORAL
Qty: 360 TABLET | Refills: 3 | Status: SHIPPED | OUTPATIENT
Start: 2023-09-07

## 2023-09-07 NOTE — PROGRESS NOTES
Chief Complaint   Patient presents with    Diabetes     History of Present Illness: Ty Billings is here for fu of  Type 2 Diabetes Mellitus.      She is checking the blood glucose, few numbers are high, had UTI after 2 years , started on vaginal estrogen cream  No frequent yeast infections  Eye exam - up to date, no issues    Prior history   Chronic UTI, followed by urologist, on antibiotics, vaginal estrogen cream, Synjardy was discontinued  Off Actos as she gained weight   She is back on Synjardy no UTI, last UTI was a year ago   No hypoglycemia  Tolerating Trulicity  Lost weight due to Covid May 2022       DM hx  Type 2 Diabetes was diagnosed in 1999  Cardiovascular risk factors: family history, diabetes mellitus, obesity, hypertension, post-menopausal         Physical Examination:    General: pleasant, no distress, good eye contact  HEENT: no pallor, no periorbital edema, EOMI  Neck: supple,  Cardiovascular: regular, normal rate, normal S1 and S2  Respiratory: clear to auscultation bilaterally    Musculoskeletal[de-identified] no edema,  Neurological:alert and oriented  Psychiatric: normal mood and affect  Skin: color, texture, turgor normal.     Diabetic foot exam ; September 2023    H/o partial or complete amputation of foot : No  H/o previous foot ulceration : No  H/o pre - ulcerative callus : No  H/o peripheral neuropathy and callus : No  H/o poor circulation  : No  Foot deformity : Hammertoe        Data Reviewed:        Lab Results   Component Value Date    GFRAA 75 01/18/2022        Lab Results   Component Value Date    LABA1C 7.4 (H) 08/31/2023    LABA1C 7.1 (H) 10/18/2022    LABA1C 7.2 (H) 05/03/2022         Lab Results   Component Value Date    LDLCALC 83 01/18/2022    TRIG 142 01/18/2022    HDL 33 (L) 01/18/2022     08/31/2023    K 4.3 08/31/2023     08/31/2023    CO2 29 08/31/2023    BUN 14 08/31/2023    CREATININE 1.07 (H) 08/31/2023    GFRAA 75 01/18/2022    GLUCOSE 147 (H) 08/31/2023

## 2023-09-07 NOTE — PROGRESS NOTES
Ryan Monge is a 70 y.o. female here for   Chief Complaint   Patient presents with    Diabetes       1. Have you been to the ER, urgent care clinic since your last visit? Hospitalized since your last visit? - no    2. Have you seen or consulted any other health care providers outside of the [address] [place] [address] Avenue since your last visit?   Include any pap smears or colon screening.-no

## 2023-12-11 ENCOUNTER — TELEPHONE (OUTPATIENT)
Age: 63
End: 2023-12-11

## 2023-12-11 NOTE — TELEPHONE ENCOUNTER
Patient asked if nurse can log into [facility] to see bloodwork stating tylor sends labs through [facility] for her. She wants to know if she needs to still get other blood work done

## 2024-01-10 ENCOUNTER — OFFICE VISIT (OUTPATIENT)
Age: 64
End: 2024-01-10

## 2024-01-10 VITALS
HEART RATE: 89 BPM | HEIGHT: 70 IN | SYSTOLIC BLOOD PRESSURE: 148 MMHG | RESPIRATION RATE: 18 BRPM | OXYGEN SATURATION: 98 % | DIASTOLIC BLOOD PRESSURE: 69 MMHG | BODY MASS INDEX: 29.12 KG/M2 | WEIGHT: 203.4 LBS | TEMPERATURE: 97.8 F

## 2024-01-10 DIAGNOSIS — I10 ESSENTIAL HYPERTENSION: ICD-10-CM

## 2024-01-10 DIAGNOSIS — E78.2 MIXED HYPERLIPIDEMIA: ICD-10-CM

## 2024-01-10 DIAGNOSIS — E11.65 TYPE 2 DIABETES MELLITUS WITH HYPERGLYCEMIA, WITHOUT LONG-TERM CURRENT USE OF INSULIN (HCC): Primary | ICD-10-CM

## 2024-01-10 RX ORDER — DULAGLUTIDE 3 MG/.5ML
3 INJECTION, SOLUTION SUBCUTANEOUS
Qty: 6 ML | Refills: 3 | Status: SHIPPED | OUTPATIENT
Start: 2024-01-10

## 2024-01-10 NOTE — PROGRESS NOTES
Alfei Gaston is a 63 y.o. female here for   Chief Complaint   Patient presents with    Diabetes       1. Have you been to the ER, urgent care clinic since your last visit?  Hospitalized since your last visit? - No    2. Have you seen or consulted any other health care providers outside of the [facility] since your last visit?  Include any pap smears or colon screening.- No

## 2024-01-10 NOTE — PROGRESS NOTES
Chief Complaint   Patient presents with    Diabetes     History of Present Illness: Alfie Gaston is here for fu of  Type 2 Diabetes Mellitus.     She is checking the blood glucose, no further urinary tract infections or yeast infections  Hydrating well  Lower extremity swelling  No severe hypoglycemia  Checking blood glucose fasting  Less than 120  Eye exam -last year    Prior history   Chronic UTI, followed by urologist, on antibiotics, vaginal estrogen cream, Synjardy was discontinued  Off Actos as she gained weight   She is back on Synjardy no UTI, last UTI was a year ago   No hypoglycemia  Tolerating Trulicity  Lost weight due to Covid May 2022       DM hx  Type 2 Diabetes was diagnosed in 1999  Cardiovascular risk factors: family history, diabetes mellitus, obesity, hypertension, post-menopausal         Physical Examination:    General: pleasant, no distress, good eye contact  HEENT: no pallor, no periorbital edema, EOMI  Neck: supple,  Cardiovascular: regular, normal rate, normal S1 and S2  Respiratory: clear to auscultation bilaterally    Musculoskeletal:: no edema,  Neurological:alert and oriented  Psychiatric: normal mood and affect  Skin: color, texture, turgor normal.     Diabetic foot exam ; noted 2024    H/o partial or complete amputation of foot : No  H/o previous foot ulceration : No  H/o pre - ulcerative callus : No  H/o peripheral neuropathy and callus : No  H/o poor circulation  : No  Foot deformity : Hammertoe    Left foot mild edema, varicose veins    Data Reviewed:        Lab Results   Component Value Date    GFRAA 75 01/18/2022        Lab Results   Component Value Date    LABA1C 7.4 (H) 08/31/2023    LABA1C 7.1 (H) 10/18/2022    LABA1C 7.2 (H) 05/03/2022         Lab Results   Component Value Date    LDLCALC 83 01/18/2022    TRIG 142 01/18/2022    HDL 33 (L) 01/18/2022     08/31/2023    K 4.3 08/31/2023     08/31/2023    CO2 29 08/31/2023    BUN 14 08/31/2023    CREATININE 1.07

## 2024-03-16 DIAGNOSIS — E11.65 TYPE 2 DIABETES MELLITUS WITH HYPERGLYCEMIA, WITHOUT LONG-TERM CURRENT USE OF INSULIN (HCC): ICD-10-CM

## 2024-03-17 RX ORDER — DULAGLUTIDE 3 MG/.5ML
3 INJECTION, SOLUTION SUBCUTANEOUS
Qty: 6 ML | Refills: 3 | Status: SHIPPED | OUTPATIENT
Start: 2024-03-17

## 2024-04-19 DIAGNOSIS — E11.65 TYPE 2 DIABETES MELLITUS WITH HYPERGLYCEMIA, WITHOUT LONG-TERM CURRENT USE OF INSULIN (HCC): Primary | ICD-10-CM

## 2024-04-19 RX ORDER — EMPAGLIFLOZIN, METFORMIN HYDROCHLORIDE 12.5; 1 MG/1; MG/1
TABLET, EXTENDED RELEASE ORAL
Qty: 180 TABLET | Refills: 3 | Status: SHIPPED | OUTPATIENT
Start: 2024-04-19

## 2024-05-08 ENCOUNTER — NURSE ONLY (OUTPATIENT)
Age: 64
End: 2024-05-08

## 2024-05-08 DIAGNOSIS — E78.2 MIXED HYPERLIPIDEMIA: ICD-10-CM

## 2024-05-08 DIAGNOSIS — E11.65 TYPE 2 DIABETES MELLITUS WITH HYPERGLYCEMIA, WITHOUT LONG-TERM CURRENT USE OF INSULIN (HCC): ICD-10-CM

## 2024-05-09 LAB
ANION GAP SERPL CALC-SCNC: 4 MMOL/L (ref 5–15)
BUN SERPL-MCNC: 15 MG/DL (ref 6–20)
BUN/CREAT SERPL: 13 (ref 12–20)
CALCIUM SERPL-MCNC: 10.1 MG/DL (ref 8.5–10.1)
CHLORIDE SERPL-SCNC: 107 MMOL/L (ref 97–108)
CO2 SERPL-SCNC: 28 MMOL/L (ref 21–32)
CREAT SERPL-MCNC: 1.16 MG/DL (ref 0.55–1.02)
EST. AVERAGE GLUCOSE BLD GHB EST-MCNC: 154 MG/DL
GLUCOSE SERPL-MCNC: 122 MG/DL (ref 65–100)
HBA1C MFR BLD: 7 % (ref 4–5.6)
LDLC SERPL DIRECT ASSAY-MCNC: 70 MG/DL (ref 0–100)
POTASSIUM SERPL-SCNC: 5 MMOL/L (ref 3.5–5.1)
SODIUM SERPL-SCNC: 139 MMOL/L (ref 136–145)

## 2024-05-22 ENCOUNTER — OFFICE VISIT (OUTPATIENT)
Age: 64
End: 2024-05-22
Payer: MEDICARE

## 2024-05-22 VITALS
WEIGHT: 198 LBS | DIASTOLIC BLOOD PRESSURE: 80 MMHG | HEIGHT: 70 IN | HEART RATE: 86 BPM | TEMPERATURE: 98 F | BODY MASS INDEX: 28.35 KG/M2 | SYSTOLIC BLOOD PRESSURE: 126 MMHG | RESPIRATION RATE: 18 BRPM | OXYGEN SATURATION: 100 %

## 2024-05-22 DIAGNOSIS — E78.1 ESSENTIAL HYPERTRIGLYCERIDEMIA: ICD-10-CM

## 2024-05-22 DIAGNOSIS — Z79.4 TYPE 2 DIABETES MELLITUS WITH HYPERGLYCEMIA, WITH LONG-TERM CURRENT USE OF INSULIN (HCC): Primary | ICD-10-CM

## 2024-05-22 DIAGNOSIS — I10 PRIMARY HYPERTENSION: ICD-10-CM

## 2024-05-22 DIAGNOSIS — E11.65 TYPE 2 DIABETES MELLITUS WITH HYPERGLYCEMIA, WITH LONG-TERM CURRENT USE OF INSULIN (HCC): Primary | ICD-10-CM

## 2024-05-22 PROCEDURE — 3074F SYST BP LT 130 MM HG: CPT | Performed by: INTERNAL MEDICINE

## 2024-05-22 PROCEDURE — 3017F COLORECTAL CA SCREEN DOC REV: CPT | Performed by: INTERNAL MEDICINE

## 2024-05-22 PROCEDURE — 1036F TOBACCO NON-USER: CPT | Performed by: INTERNAL MEDICINE

## 2024-05-22 PROCEDURE — G8428 CUR MEDS NOT DOCUMENT: HCPCS | Performed by: INTERNAL MEDICINE

## 2024-05-22 PROCEDURE — G2211 COMPLEX E/M VISIT ADD ON: HCPCS | Performed by: INTERNAL MEDICINE

## 2024-05-22 PROCEDURE — 3079F DIAST BP 80-89 MM HG: CPT | Performed by: INTERNAL MEDICINE

## 2024-05-22 PROCEDURE — 99214 OFFICE O/P EST MOD 30 MIN: CPT | Performed by: INTERNAL MEDICINE

## 2024-05-22 PROCEDURE — 2022F DILAT RTA XM EVC RTNOPTHY: CPT | Performed by: INTERNAL MEDICINE

## 2024-05-22 PROCEDURE — G8400 PT W/DXA NO RESULTS DOC: HCPCS | Performed by: INTERNAL MEDICINE

## 2024-05-22 PROCEDURE — 1090F PRES/ABSN URINE INCON ASSESS: CPT | Performed by: INTERNAL MEDICINE

## 2024-05-22 PROCEDURE — 1123F ACP DISCUSS/DSCN MKR DOCD: CPT | Performed by: INTERNAL MEDICINE

## 2024-05-22 PROCEDURE — G8419 CALC BMI OUT NRM PARAM NOF/U: HCPCS | Performed by: INTERNAL MEDICINE

## 2024-05-22 PROCEDURE — 3051F HG A1C>EQUAL 7.0%<8.0%: CPT | Performed by: INTERNAL MEDICINE

## 2024-05-22 RX ORDER — NITROFURANTOIN 25; 75 MG/1; MG/1
CAPSULE ORAL
COMMUNITY
Start: 2024-05-19

## 2024-05-22 NOTE — PROGRESS NOTES
History of Present Illness: Alfie Gaston is here for fu of  Type 2 Diabetes Mellitus.     Type 2 diabetes mellitus was diagnosed in 1999, was on insulin in the past  Now on Trulicity, Synjardy, glipizide as needed  Diagnosed with aneurysm, [facility], [facility], results pending  No severe hypoglycemia  Checking blood glucose fasting  Less than 120    Eye exam -last year    Prior history   Chronic UTI, followed by urologist, on antibiotics, vaginal estrogen cream, Synjardy was discontinued  Off Actos as she gained weight   She is back on Synjardy no UTI, last UTI was a year ago   No hypoglycemia  Tolerating Trulicity  Lost weight due to Covid May 2022             Physical Examination:    General: pleasant, no distress, good eye contact  HEENT: no pallor, no periorbital edema, EOMI  Neck: supple,  Cardiovascular: regular, normal rate, normal S1 and S2  Respiratory: clear to auscultation bilaterally    Musculoskeletal:: no edema,  Neurological:alert and oriented  Psychiatric: normal mood and affect  Skin: color, texture, turgor normal.     Diabetic foot exam ;  2024    H/o partial or complete amputation of foot : No  H/o previous foot ulceration : No  H/o pre - ulcerative callus : No  H/o peripheral neuropathy and callus : No  H/o poor circulation  : No  Foot deformity : Hammertoe    Left foot mild edema, varicose veins    Data Reviewed:        Lab Results   Component Value Date    GFRAA >60.0 03/30/2024        Lab Results   Component Value Date    LABA1C 7.0 (H) 05/08/2024    LABA1C 7.4 (H) 08/31/2023    LABA1C 7.1 (H) 10/18/2022         Lab Results   Component Value Date    TRIG 142 01/18/2022    HDL 33 (L) 01/18/2022     05/08/2024    K 5.0 05/08/2024     05/08/2024    CO2 28 05/08/2024    BUN 15 05/08/2024    CREATININE 1.16 (H) 05/08/2024    GFRAA >60.0 03/30/2024    GLUCOSE 122 (H) 05/08/2024    CALCIUM 10.1 05/08/2024    MALBCR <3 11/24/2022                Assessment/Plan:     1.

## 2024-07-02 DIAGNOSIS — E11.65 TYPE 2 DIABETES MELLITUS WITH HYPERGLYCEMIA, UNSPECIFIED WHETHER LONG TERM INSULIN USE (HCC): ICD-10-CM

## 2024-07-03 RX ORDER — GLIPIZIDE 5 MG/1
TABLET, FILM COATED, EXTENDED RELEASE ORAL
Qty: 360 TABLET | Refills: 0 | Status: SHIPPED | OUTPATIENT
Start: 2024-07-03

## 2024-07-23 ENCOUNTER — PATIENT MESSAGE (OUTPATIENT)
Age: 64
End: 2024-07-23

## 2024-07-23 DIAGNOSIS — E11.65 TYPE 2 DIABETES MELLITUS WITH HYPERGLYCEMIA, WITHOUT LONG-TERM CURRENT USE OF INSULIN (HCC): ICD-10-CM

## 2024-07-23 RX ORDER — DULAGLUTIDE 3 MG/.5ML
3 INJECTION, SOLUTION SUBCUTANEOUS
Qty: 6 ML | Refills: 3 | Status: SHIPPED | OUTPATIENT
Start: 2024-07-23

## 2024-07-23 NOTE — TELEPHONE ENCOUNTER
From: Alfie Adorno  To: Dr. Bobbi Bennett  Sent: 7/23/2024 1:44 PM EDT  Subject: Trulicity     Dr Bennett, would you please send a new prescription to the [facility] for my Trulicity 3 mg. [facility]t have it and [facility] says they have a limited supply.    Thank you, Alfie Adorno   [phone]

## 2024-09-17 ENCOUNTER — LAB (OUTPATIENT)
Age: 64
End: 2024-09-17

## 2024-09-17 DIAGNOSIS — I10 PRIMARY HYPERTENSION: ICD-10-CM

## 2024-09-17 DIAGNOSIS — E78.1 ESSENTIAL HYPERTRIGLYCERIDEMIA: ICD-10-CM

## 2024-09-17 DIAGNOSIS — Z79.4 TYPE 2 DIABETES MELLITUS WITH HYPERGLYCEMIA, WITH LONG-TERM CURRENT USE OF INSULIN (HCC): ICD-10-CM

## 2024-09-17 DIAGNOSIS — E11.65 TYPE 2 DIABETES MELLITUS WITH HYPERGLYCEMIA, WITH LONG-TERM CURRENT USE OF INSULIN (HCC): ICD-10-CM

## 2024-09-17 LAB
CREAT UR-MCNC: 98 MG/DL
EST. AVERAGE GLUCOSE BLD GHB EST-MCNC: 157 MG/DL
HBA1C MFR BLD: 7.1 % (ref 4–5.6)
MICROALBUMIN UR-MCNC: 0.79 MG/DL
MICROALBUMIN/CREAT UR-RTO: 8 MG/G (ref 0–30)

## 2024-09-20 PROBLEM — D17.9 LIPOMA: Status: ACTIVE | Noted: 2024-09-20

## 2024-09-20 PROBLEM — H25.819 COMBINED FORM OF SENILE CATARACT: Status: ACTIVE | Noted: 2024-09-20

## 2024-09-20 PROBLEM — N18.30 STAGE 3 CHRONIC KIDNEY DISEASE (HCC): Chronic | Status: ACTIVE | Noted: 2020-01-21

## 2024-09-20 PROBLEM — M72.2 PLANTAR FASCIITIS OF RIGHT FOOT: Status: ACTIVE | Noted: 2024-09-20

## 2024-09-20 PROBLEM — L20.81 ATOPIC NEURODERMATITIS: Status: ACTIVE | Noted: 2024-07-17

## 2024-09-20 PROBLEM — H53.40 VISUAL FIELD DEFECT: Status: ACTIVE | Noted: 2024-09-20

## 2024-09-20 PROBLEM — I83.92 ASYMPTOMATIC VARICOSE VEINS OF LEFT LOWER EXTREMITY: Status: ACTIVE | Noted: 2017-11-27

## 2024-09-20 PROBLEM — G56.22 CUBITAL TUNNEL SYNDROME ON LEFT: Status: ACTIVE | Noted: 2024-09-20

## 2024-09-20 PROBLEM — G56.00 CARPAL TUNNEL SYNDROME: Status: ACTIVE | Noted: 2024-09-20

## 2024-09-23 ENCOUNTER — OFFICE VISIT (OUTPATIENT)
Age: 64
End: 2024-09-23
Payer: MEDICARE

## 2024-09-23 VITALS
SYSTOLIC BLOOD PRESSURE: 148 MMHG | BODY MASS INDEX: 29 KG/M2 | DIASTOLIC BLOOD PRESSURE: 65 MMHG | HEART RATE: 72 BPM | WEIGHT: 202.1 LBS | TEMPERATURE: 98 F | OXYGEN SATURATION: 100 %

## 2024-09-23 DIAGNOSIS — E11.65 TYPE 2 DIABETES MELLITUS WITH HYPERGLYCEMIA, WITH LONG-TERM CURRENT USE OF INSULIN (HCC): Primary | ICD-10-CM

## 2024-09-23 DIAGNOSIS — I10 PRIMARY HYPERTENSION: ICD-10-CM

## 2024-09-23 DIAGNOSIS — Z79.4 TYPE 2 DIABETES MELLITUS WITH HYPERGLYCEMIA, WITH LONG-TERM CURRENT USE OF INSULIN (HCC): Primary | ICD-10-CM

## 2024-09-23 DIAGNOSIS — E78.2 MIXED HYPERLIPIDEMIA: ICD-10-CM

## 2024-09-23 DIAGNOSIS — E11.65 TYPE 2 DIABETES MELLITUS WITH HYPERGLYCEMIA, UNSPECIFIED WHETHER LONG TERM INSULIN USE (HCC): ICD-10-CM

## 2024-09-23 PROCEDURE — 3077F SYST BP >= 140 MM HG: CPT | Performed by: INTERNAL MEDICINE

## 2024-09-23 PROCEDURE — 2022F DILAT RTA XM EVC RTNOPTHY: CPT | Performed by: INTERNAL MEDICINE

## 2024-09-23 PROCEDURE — 3017F COLORECTAL CA SCREEN DOC REV: CPT | Performed by: INTERNAL MEDICINE

## 2024-09-23 PROCEDURE — 99214 OFFICE O/P EST MOD 30 MIN: CPT | Performed by: INTERNAL MEDICINE

## 2024-09-23 PROCEDURE — 1090F PRES/ABSN URINE INCON ASSESS: CPT | Performed by: INTERNAL MEDICINE

## 2024-09-23 PROCEDURE — G8419 CALC BMI OUT NRM PARAM NOF/U: HCPCS | Performed by: INTERNAL MEDICINE

## 2024-09-23 PROCEDURE — 3051F HG A1C>EQUAL 7.0%<8.0%: CPT | Performed by: INTERNAL MEDICINE

## 2024-09-23 PROCEDURE — 3078F DIAST BP <80 MM HG: CPT | Performed by: INTERNAL MEDICINE

## 2024-09-23 PROCEDURE — G8427 DOCREV CUR MEDS BY ELIG CLIN: HCPCS | Performed by: INTERNAL MEDICINE

## 2024-09-23 PROCEDURE — 1036F TOBACCO NON-USER: CPT | Performed by: INTERNAL MEDICINE

## 2024-09-23 PROCEDURE — 1123F ACP DISCUSS/DSCN MKR DOCD: CPT | Performed by: INTERNAL MEDICINE

## 2024-09-23 PROCEDURE — G2211 COMPLEX E/M VISIT ADD ON: HCPCS | Performed by: INTERNAL MEDICINE

## 2024-09-23 PROCEDURE — G8400 PT W/DXA NO RESULTS DOC: HCPCS | Performed by: INTERNAL MEDICINE

## 2024-09-23 RX ORDER — BLOOD-GLUCOSE METER
KIT MISCELLANEOUS
Qty: 300 EACH | Refills: 3 | Status: SHIPPED | OUTPATIENT
Start: 2024-09-23

## 2024-09-23 RX ORDER — LINACLOTIDE 290 UG/1
290 CAPSULE, GELATIN COATED ORAL
COMMUNITY

## 2024-09-23 RX ORDER — LANCETS 28 GAUGE
EACH MISCELLANEOUS
Qty: 300 EACH | Refills: 3 | Status: SHIPPED | OUTPATIENT
Start: 2024-09-23

## 2024-09-23 RX ORDER — GLIPIZIDE 5 MG/1
TABLET, FILM COATED, EXTENDED RELEASE ORAL
Qty: 360 TABLET | Refills: 3 | Status: SHIPPED | OUTPATIENT
Start: 2024-09-23

## 2025-01-15 ENCOUNTER — LAB (OUTPATIENT)
Age: 65
End: 2025-01-15

## 2025-01-15 DIAGNOSIS — E11.65 TYPE 2 DIABETES MELLITUS WITH HYPERGLYCEMIA, WITH LONG-TERM CURRENT USE OF INSULIN (HCC): ICD-10-CM

## 2025-01-15 DIAGNOSIS — I10 PRIMARY HYPERTENSION: ICD-10-CM

## 2025-01-15 DIAGNOSIS — Z79.4 TYPE 2 DIABETES MELLITUS WITH HYPERGLYCEMIA, WITH LONG-TERM CURRENT USE OF INSULIN (HCC): ICD-10-CM

## 2025-01-15 DIAGNOSIS — E78.2 MIXED HYPERLIPIDEMIA: ICD-10-CM

## 2025-01-16 LAB
ANION GAP SERPL CALC-SCNC: 7 MMOL/L (ref 2–12)
BUN SERPL-MCNC: 24 MG/DL (ref 6–20)
BUN/CREAT SERPL: 21 (ref 12–20)
CALCIUM SERPL-MCNC: 9.8 MG/DL (ref 8.5–10.1)
CHLORIDE SERPL-SCNC: 107 MMOL/L (ref 97–108)
CHOLEST SERPL-MCNC: 149 MG/DL
CO2 SERPL-SCNC: 25 MMOL/L (ref 21–32)
CREAT SERPL-MCNC: 1.14 MG/DL (ref 0.55–1.02)
CREAT UR-MCNC: 68.4 MG/DL
EST. AVERAGE GLUCOSE BLD GHB EST-MCNC: 148 MG/DL
GLUCOSE SERPL-MCNC: 257 MG/DL (ref 65–100)
HBA1C MFR BLD: 6.8 % (ref 4–5.6)
HDLC SERPL-MCNC: 40 MG/DL
HDLC SERPL: 3.7 (ref 0–5)
LDLC SERPL CALC-MCNC: 58.4 MG/DL (ref 0–100)
MICROALBUMIN UR-MCNC: 0.83 MG/DL
MICROALBUMIN/CREAT UR-RTO: 12 MG/G (ref 0–30)
POTASSIUM SERPL-SCNC: 4.4 MMOL/L (ref 3.5–5.1)
SODIUM SERPL-SCNC: 139 MMOL/L (ref 136–145)
TRIGL SERPL-MCNC: 253 MG/DL
VLDLC SERPL CALC-MCNC: 50.6 MG/DL

## 2025-01-22 ENCOUNTER — OFFICE VISIT (OUTPATIENT)
Age: 65
End: 2025-01-22
Payer: MEDICARE

## 2025-01-22 VITALS
BODY MASS INDEX: 28.92 KG/M2 | TEMPERATURE: 98.3 F | SYSTOLIC BLOOD PRESSURE: 133 MMHG | OXYGEN SATURATION: 98 % | HEART RATE: 84 BPM | RESPIRATION RATE: 16 BRPM | HEIGHT: 70 IN | DIASTOLIC BLOOD PRESSURE: 65 MMHG | WEIGHT: 202 LBS

## 2025-01-22 DIAGNOSIS — Z79.4 TYPE 2 DIABETES MELLITUS WITH HYPERGLYCEMIA, WITH LONG-TERM CURRENT USE OF INSULIN (HCC): Primary | ICD-10-CM

## 2025-01-22 DIAGNOSIS — E78.2 MIXED HYPERLIPIDEMIA: ICD-10-CM

## 2025-01-22 DIAGNOSIS — E11.65 TYPE 2 DIABETES MELLITUS WITH HYPERGLYCEMIA, WITH LONG-TERM CURRENT USE OF INSULIN (HCC): Primary | ICD-10-CM

## 2025-01-22 DIAGNOSIS — N18.30 STAGE 3 CHRONIC KIDNEY DISEASE, UNSPECIFIED WHETHER STAGE 3A OR 3B CKD (HCC): Chronic | ICD-10-CM

## 2025-01-22 DIAGNOSIS — I10 PRIMARY HYPERTENSION: ICD-10-CM

## 2025-01-22 PROCEDURE — 99214 OFFICE O/P EST MOD 30 MIN: CPT | Performed by: INTERNAL MEDICINE

## 2025-01-22 ASSESSMENT — PATIENT HEALTH QUESTIONNAIRE - PHQ9
SUM OF ALL RESPONSES TO PHQ QUESTIONS 1-9: 0
1. LITTLE INTEREST OR PLEASURE IN DOING THINGS: NOT AT ALL
SUM OF ALL RESPONSES TO PHQ QUESTIONS 1-9: 0
2. FEELING DOWN, DEPRESSED OR HOPELESS: NOT AT ALL
SUM OF ALL RESPONSES TO PHQ QUESTIONS 1-9: 0
SUM OF ALL RESPONSES TO PHQ QUESTIONS 1-9: 0
SUM OF ALL RESPONSES TO PHQ9 QUESTIONS 1 & 2: 0

## 2025-01-22 NOTE — PROGRESS NOTES
Alfie Adorno is a 64 y.o. female here for   Chief Complaint   Patient presents with    Diabetes       1. Have you been to the ER, urgent care clinic since your last visit?  Hospitalized since your last visit? -no    2. Have you seen or consulted any other health care providers outside of the [facility] since your last visit?  Include any pap smears or colon screening.-Dermatology and Allergist

## 2025-01-22 NOTE — PROGRESS NOTES
History of Present Illness: Alfie Gaston is here for fu of  Type 2 Diabetes Mellitus.     Type 2 diabetes mellitus was diagnosed in 1999, was on insulin in the past  Now on Trulicity, Synjardy, glipizide   Had labs  Diagnosed with aneurysm, seen neurosurgery at [facility], no changes   No severe hypoglycemia  Checking blood glucose fasting        Chest pain last night , non exertional , she took some pepcid and ASA  Stress test several years ago was normal     Eye exam -last year    Prior history   Chronic UTI, followed by urologist, on antibiotics, vaginal estrogen cream, Synjardy was discontinued  Off Actos as she gained weight   She is back on Synjardy no UTI, last UTI was a year ago   No hypoglycemia  Tolerating Trulicity  Lost weight due to Covid May 2022             Physical Examination:    General: pleasant, no distress, good eye contact  HEENT: no pallor, no periorbital edema, EOMI  Neck: supple,  Cardiovascular: regular, normal rate, normal S1 and S2  Respiratory: clear to auscultation bilaterally    Musculoskeletal:: no edema,  Neurological:alert and oriented  Psychiatric: normal mood and affect  Skin: color, texture, turgor normal.     Diabetic foot exam ;  2024    H/o partial or complete amputation of foot : No  H/o previous foot ulceration : No  H/o pre - ulcerative callus : No  H/o peripheral neuropathy and callus : No  H/o poor circulation  : No  Foot deformity : Hammertoe    Left foot mild edema, varicose veins    Data Reviewed:        Lab Results   Component Value Date    GFRAA >60.0 03/30/2024        Lab Results   Component Value Date    LABA1C 6.8 (H) 01/15/2025    LABA1C 7.1 (H) 09/17/2024    LABA1C 7.0 (H) 05/08/2024         Lab Results   Component Value Date    TRIG 253 (H) 01/15/2025    HDL 40 01/15/2025     01/15/2025    K 4.4 01/15/2025     01/15/2025    CO2 25 01/15/2025    BUN 24 (H) 01/15/2025    CREATININE 1.14 (H) 01/15/2025    GFRAA >60.0 03/30/2024    GLUCOSE 257 (H)

## 2025-04-14 DIAGNOSIS — E11.65 TYPE 2 DIABETES MELLITUS WITH HYPERGLYCEMIA, WITHOUT LONG-TERM CURRENT USE OF INSULIN (HCC): ICD-10-CM

## 2025-04-14 RX ORDER — EMPAGLIFLOZIN, METFORMIN HYDROCHLORIDE 12.5; 1 MG/1; MG/1
1 TABLET, EXTENDED RELEASE ORAL 2 TIMES DAILY
Qty: 180 TABLET | Refills: 3 | Status: SHIPPED | OUTPATIENT
Start: 2025-04-14

## 2025-05-16 ENCOUNTER — LAB (OUTPATIENT)
Age: 65
End: 2025-05-16

## 2025-05-16 DIAGNOSIS — E78.2 MIXED HYPERLIPIDEMIA: ICD-10-CM

## 2025-05-16 DIAGNOSIS — Z79.4 TYPE 2 DIABETES MELLITUS WITH HYPERGLYCEMIA, WITH LONG-TERM CURRENT USE OF INSULIN (HCC): ICD-10-CM

## 2025-05-16 DIAGNOSIS — E11.65 TYPE 2 DIABETES MELLITUS WITH HYPERGLYCEMIA, WITH LONG-TERM CURRENT USE OF INSULIN (HCC): ICD-10-CM

## 2025-05-16 LAB
ANION GAP SERPL CALC-SCNC: 9 MMOL/L (ref 2–12)
BUN SERPL-MCNC: 18 MG/DL (ref 6–20)
BUN/CREAT SERPL: 16 (ref 12–20)
CALCIUM SERPL-MCNC: 9.4 MG/DL (ref 8.5–10.1)
CHLORIDE SERPL-SCNC: 106 MMOL/L (ref 97–108)
CO2 SERPL-SCNC: 27 MMOL/L (ref 21–32)
CREAT SERPL-MCNC: 1.16 MG/DL (ref 0.55–1.02)
EST. AVERAGE GLUCOSE BLD GHB EST-MCNC: 169 MG/DL
GLUCOSE SERPL-MCNC: 117 MG/DL (ref 65–100)
HBA1C MFR BLD: 7.5 % (ref 4–5.6)
POTASSIUM SERPL-SCNC: 4.1 MMOL/L (ref 3.5–5.1)
SODIUM SERPL-SCNC: 142 MMOL/L (ref 136–145)

## 2025-05-23 ENCOUNTER — OFFICE VISIT (OUTPATIENT)
Age: 65
End: 2025-05-23
Payer: MEDICARE

## 2025-05-23 VITALS
HEIGHT: 70 IN | OXYGEN SATURATION: 99 % | WEIGHT: 204.3 LBS | HEART RATE: 86 BPM | BODY MASS INDEX: 29.25 KG/M2 | TEMPERATURE: 97.4 F | SYSTOLIC BLOOD PRESSURE: 139 MMHG | DIASTOLIC BLOOD PRESSURE: 76 MMHG

## 2025-05-23 DIAGNOSIS — Z79.4 TYPE 2 DIABETES MELLITUS WITH HYPERGLYCEMIA, WITH LONG-TERM CURRENT USE OF INSULIN (HCC): Primary | ICD-10-CM

## 2025-05-23 DIAGNOSIS — E11.65 TYPE 2 DIABETES MELLITUS WITH HYPERGLYCEMIA, WITH LONG-TERM CURRENT USE OF INSULIN (HCC): Primary | ICD-10-CM

## 2025-05-23 DIAGNOSIS — I10 PRIMARY HYPERTENSION: ICD-10-CM

## 2025-05-23 DIAGNOSIS — E78.2 MIXED HYPERLIPIDEMIA: ICD-10-CM

## 2025-05-23 DIAGNOSIS — N18.30 STAGE 3 CHRONIC KIDNEY DISEASE, UNSPECIFIED WHETHER STAGE 3A OR 3B CKD (HCC): Chronic | ICD-10-CM

## 2025-05-23 PROCEDURE — G8400 PT W/DXA NO RESULTS DOC: HCPCS | Performed by: INTERNAL MEDICINE

## 2025-05-23 PROCEDURE — 99214 OFFICE O/P EST MOD 30 MIN: CPT | Performed by: INTERNAL MEDICINE

## 2025-05-23 PROCEDURE — 1160F RVW MEDS BY RX/DR IN RCRD: CPT | Performed by: INTERNAL MEDICINE

## 2025-05-23 PROCEDURE — G2211 COMPLEX E/M VISIT ADD ON: HCPCS | Performed by: INTERNAL MEDICINE

## 2025-05-23 PROCEDURE — 3078F DIAST BP <80 MM HG: CPT | Performed by: INTERNAL MEDICINE

## 2025-05-23 PROCEDURE — G8419 CALC BMI OUT NRM PARAM NOF/U: HCPCS | Performed by: INTERNAL MEDICINE

## 2025-05-23 PROCEDURE — 1090F PRES/ABSN URINE INCON ASSESS: CPT | Performed by: INTERNAL MEDICINE

## 2025-05-23 PROCEDURE — 1123F ACP DISCUSS/DSCN MKR DOCD: CPT | Performed by: INTERNAL MEDICINE

## 2025-05-23 PROCEDURE — 3017F COLORECTAL CA SCREEN DOC REV: CPT | Performed by: INTERNAL MEDICINE

## 2025-05-23 PROCEDURE — 1036F TOBACCO NON-USER: CPT | Performed by: INTERNAL MEDICINE

## 2025-05-23 PROCEDURE — 2022F DILAT RTA XM EVC RTNOPTHY: CPT | Performed by: INTERNAL MEDICINE

## 2025-05-23 PROCEDURE — 3051F HG A1C>EQUAL 7.0%<8.0%: CPT | Performed by: INTERNAL MEDICINE

## 2025-05-23 PROCEDURE — 1126F AMNT PAIN NOTED NONE PRSNT: CPT | Performed by: INTERNAL MEDICINE

## 2025-05-23 PROCEDURE — G8427 DOCREV CUR MEDS BY ELIG CLIN: HCPCS | Performed by: INTERNAL MEDICINE

## 2025-05-23 PROCEDURE — 3075F SYST BP GE 130 - 139MM HG: CPT | Performed by: INTERNAL MEDICINE

## 2025-05-23 PROCEDURE — 1159F MED LIST DOCD IN RCRD: CPT | Performed by: INTERNAL MEDICINE

## 2025-05-23 RX ORDER — DULAGLUTIDE 3 MG/.5ML
3 INJECTION, SOLUTION SUBCUTANEOUS WEEKLY
Qty: 6 ML | Refills: 3 | Status: SHIPPED | OUTPATIENT
Start: 2025-05-23

## 2025-05-23 NOTE — PROGRESS NOTES
History of Present Illness: Alfie Gaston is here for fu of  Type 2 Diabetes Mellitus.     Type 2 diabetes mellitus was diagnosed in 1999, was on insulin in the past  Now on Trulicity, Synjardy, glipizide      Diagnosed with aneurysm, seen neurosurgery at [facility], no changes   No severe hypoglycemia  Checking blood glucose        Prior history   Chronic UTI, followed by urologist, on antibiotics, vaginal estrogen cream, Synjardy was discontinued  Off Actos as she gained weight   She is back on Synjardy no UTI, last UTI was a year ago   No hypoglycemia  Tolerating Trulicity  Lost weight due to Covid May 2022             Physical Examination:    General: pleasant, no distress, good eye contact  HEENT: no pallor, no periorbital edema, EOMI  Neck: supple,  Cardiovascular: regular, normal rate, normal S1 and S2  Respiratory: clear to auscultation bilaterally    Musculoskeletal:: no edema,  Neurological:alert and oriented  Psychiatric: normal mood and affect  Skin: color, texture, turgor normal.     Diabetic foot exam ;  2024    H/o partial or complete amputation of foot : No  H/o previous foot ulceration : No  H/o pre - ulcerative callus : No  H/o peripheral neuropathy and callus : No  H/o poor circulation  : No  Foot deformity : Hammertoe    Left foot mild edema, varicose veins    Data Reviewed:        Lab Results   Component Value Date    GFRAA >60.0 03/30/2024        Lab Results   Component Value Date    LABA1C 7.5 (H) 05/16/2025    LABA1C 6.8 (H) 01/15/2025    LABA1C 7.1 (H) 09/17/2024         Lab Results   Component Value Date    TRIG 253 (H) 01/15/2025    HDL 40 01/15/2025     05/16/2025    K 4.1 05/16/2025     05/16/2025    CO2 27 05/16/2025    BUN 18 05/16/2025    CREATININE 1.16 (H) 05/16/2025    GFRAA >60.0 03/30/2024    GLUCOSE 117 (H) 05/16/2025    CALCIUM 9.4 05/16/2025                Assessment/Plan:     1. Type 2 Diabetes Mellitus     Lab Results   Component Value Date    LABA1C 7.5 (H)

## 2025-05-23 NOTE — PROGRESS NOTES
Alfie Adorno is a 64 y.o. female here for   Chief Complaint   Patient presents with    Diabetes       1. Have you been to the ER, urgent care clinic since your last visit?  Hospitalized since your last visit? -no    2. Have you seen or consulted any other health care providers outside of the [facility] since your last visit?  Include any pap smears or colon screening.-no